# Patient Record
Sex: MALE | Race: WHITE | NOT HISPANIC OR LATINO | Employment: FULL TIME | URBAN - NONMETROPOLITAN AREA
[De-identification: names, ages, dates, MRNs, and addresses within clinical notes are randomized per-mention and may not be internally consistent; named-entity substitution may affect disease eponyms.]

---

## 2021-07-06 ENCOUNTER — HOSPITAL ENCOUNTER (OUTPATIENT)
Facility: HOSPITAL | Age: 26
Discharge: HOME | End: 2021-07-07
Attending: EMERGENCY MEDICINE | Admitting: UROLOGY

## 2021-07-06 ENCOUNTER — ANESTHESIA (OUTPATIENT)
Dept: PERIOP | Facility: HOSPITAL | Age: 26
End: 2021-07-06

## 2021-07-06 ENCOUNTER — APPOINTMENT (OUTPATIENT)
Dept: GENERAL RADIOLOGY | Facility: HOSPITAL | Age: 26
End: 2021-07-06

## 2021-07-06 ENCOUNTER — ANESTHESIA EVENT (OUTPATIENT)
Dept: PERIOP | Facility: HOSPITAL | Age: 26
End: 2021-07-06

## 2021-07-06 ENCOUNTER — APPOINTMENT (OUTPATIENT)
Dept: CT IMAGING | Facility: HOSPITAL | Age: 26
End: 2021-07-06

## 2021-07-06 DIAGNOSIS — R10.9 ACUTE RIGHT FLANK PAIN: ICD-10-CM

## 2021-07-06 DIAGNOSIS — N23 RENAL COLIC ON RIGHT SIDE: Primary | ICD-10-CM

## 2021-07-06 DIAGNOSIS — N20.1 RIGHT URETERAL STONE: ICD-10-CM

## 2021-07-06 LAB
ALBUMIN SERPL-MCNC: 4.7 G/DL (ref 3.5–5.2)
ALBUMIN/GLOB SERPL: 1.7 G/DL
ALP SERPL-CCNC: 66 U/L (ref 39–117)
ALT SERPL W P-5'-P-CCNC: 25 U/L (ref 1–41)
ANION GAP SERPL CALCULATED.3IONS-SCNC: 13 MMOL/L (ref 5–15)
AST SERPL-CCNC: 20 U/L (ref 1–40)
BACTERIA UR QL AUTO: ABNORMAL /HPF
BASOPHILS # BLD AUTO: 0.06 10*3/MM3 (ref 0–0.2)
BASOPHILS NFR BLD AUTO: 0.6 % (ref 0–1.5)
BILIRUB SERPL-MCNC: 0.4 MG/DL (ref 0–1.2)
BILIRUB UR QL STRIP: ABNORMAL
BUN SERPL-MCNC: 16 MG/DL (ref 6–20)
BUN/CREAT SERPL: 14.7 (ref 7–25)
CALCIUM SPEC-SCNC: 10 MG/DL (ref 8.6–10.5)
CHLORIDE SERPL-SCNC: 104 MMOL/L (ref 98–107)
CLARITY UR: ABNORMAL
CO2 SERPL-SCNC: 22 MMOL/L (ref 22–29)
COLOR UR: ABNORMAL
CREAT SERPL-MCNC: 1.09 MG/DL (ref 0.76–1.27)
DEPRECATED RDW RBC AUTO: 44.7 FL (ref 37–54)
EOSINOPHIL # BLD AUTO: 0.21 10*3/MM3 (ref 0–0.4)
EOSINOPHIL NFR BLD AUTO: 2.2 % (ref 0.3–6.2)
ERYTHROCYTE [DISTWIDTH] IN BLOOD BY AUTOMATED COUNT: 14 % (ref 12.3–15.4)
GFR SERPL CREATININE-BSD FRML MDRD: 83 ML/MIN/1.73
GLOBULIN UR ELPH-MCNC: 2.7 GM/DL
GLUCOSE SERPL-MCNC: 133 MG/DL (ref 65–99)
GLUCOSE UR STRIP-MCNC: NEGATIVE MG/DL
HCT VFR BLD AUTO: 44.4 % (ref 37.5–51)
HGB BLD-MCNC: 15.2 G/DL (ref 13–17.7)
HGB UR QL STRIP.AUTO: ABNORMAL
HYALINE CASTS UR QL AUTO: ABNORMAL /LPF
IMM GRANULOCYTES # BLD AUTO: 0.05 10*3/MM3 (ref 0–0.05)
IMM GRANULOCYTES NFR BLD AUTO: 0.5 % (ref 0–0.5)
KETONES UR QL STRIP: NEGATIVE
LEUKOCYTE ESTERASE UR QL STRIP.AUTO: ABNORMAL
LYMPHOCYTES # BLD AUTO: 3.21 10*3/MM3 (ref 0.7–3.1)
LYMPHOCYTES NFR BLD AUTO: 33.8 % (ref 19.6–45.3)
MCH RBC QN AUTO: 30 PG (ref 26.6–33)
MCHC RBC AUTO-ENTMCNC: 34.2 G/DL (ref 31.5–35.7)
MCV RBC AUTO: 87.6 FL (ref 79–97)
MONOCYTES # BLD AUTO: 0.68 10*3/MM3 (ref 0.1–0.9)
MONOCYTES NFR BLD AUTO: 7.2 % (ref 5–12)
NEUTROPHILS NFR BLD AUTO: 5.29 10*3/MM3 (ref 1.7–7)
NEUTROPHILS NFR BLD AUTO: 55.7 % (ref 42.7–76)
NITRITE UR QL STRIP: NEGATIVE
NRBC BLD AUTO-RTO: 0 /100 WBC (ref 0–0.2)
PH UR STRIP.AUTO: <=5 [PH] (ref 5–8)
PLATELET # BLD AUTO: 292 10*3/MM3 (ref 140–450)
PMV BLD AUTO: 9.9 FL (ref 6–12)
POTASSIUM SERPL-SCNC: 3.5 MMOL/L (ref 3.5–5.2)
PROT SERPL-MCNC: 7.4 G/DL (ref 6–8.5)
PROT UR QL STRIP: ABNORMAL
RBC # BLD AUTO: 5.07 10*6/MM3 (ref 4.14–5.8)
RBC # UR: ABNORMAL /HPF
REF LAB TEST METHOD: ABNORMAL
SARS-COV-2 RNA PNL SPEC NAA+PROBE: NOT DETECTED
SODIUM SERPL-SCNC: 139 MMOL/L (ref 136–145)
SP GR UR STRIP: 1.02 (ref 1–1.03)
SQUAMOUS #/AREA URNS HPF: ABNORMAL /HPF
UROBILINOGEN UR QL STRIP: ABNORMAL
WBC # BLD AUTO: 9.5 10*3/MM3 (ref 3.4–10.8)
WBC UR QL AUTO: ABNORMAL /HPF

## 2021-07-06 PROCEDURE — 81001 URINALYSIS AUTO W/SCOPE: CPT | Performed by: EMERGENCY MEDICINE

## 2021-07-06 PROCEDURE — G0378 HOSPITAL OBSERVATION PER HR: HCPCS

## 2021-07-06 PROCEDURE — 25010000002 IOPAMIDOL 61 % SOLUTION: Performed by: UROLOGY

## 2021-07-06 PROCEDURE — C2617 STENT, NON-COR, TEM W/O DEL: HCPCS | Performed by: UROLOGY

## 2021-07-06 PROCEDURE — 96374 THER/PROPH/DIAG INJ IV PUSH: CPT

## 2021-07-06 PROCEDURE — 25010000002 DEXAMETHASONE PER 1 MG: Performed by: NURSE ANESTHETIST, CERTIFIED REGISTERED

## 2021-07-06 PROCEDURE — 80053 COMPREHEN METABOLIC PANEL: CPT | Performed by: EMERGENCY MEDICINE

## 2021-07-06 PROCEDURE — 99220 PR INITIAL OBSERVATION CARE/DAY 70 MINUTES: CPT | Performed by: UROLOGY

## 2021-07-06 PROCEDURE — 25010000002 FUROSEMIDE PER 20 MG: Performed by: NURSE ANESTHETIST, CERTIFIED REGISTERED

## 2021-07-06 PROCEDURE — C1758 CATHETER, URETERAL: HCPCS | Performed by: UROLOGY

## 2021-07-06 PROCEDURE — 25010000002 ONDANSETRON PER 1 MG: Performed by: EMERGENCY MEDICINE

## 2021-07-06 PROCEDURE — 74420 UROGRAPHY RTRGR +-KUB: CPT

## 2021-07-06 PROCEDURE — 74420 UROGRAPHY RTRGR +-KUB: CPT | Performed by: UROLOGY

## 2021-07-06 PROCEDURE — 96375 TX/PRO/DX INJ NEW DRUG ADDON: CPT

## 2021-07-06 PROCEDURE — 25010000002 DEXAMETHASONE PER 1 MG: Performed by: ANESTHESIOLOGY

## 2021-07-06 PROCEDURE — 25010000002 ONDANSETRON PER 1 MG: Performed by: NURSE ANESTHETIST, CERTIFIED REGISTERED

## 2021-07-06 PROCEDURE — 25010000002 KETOROLAC TROMETHAMINE PER 15 MG: Performed by: NURSE ANESTHETIST, CERTIFIED REGISTERED

## 2021-07-06 PROCEDURE — 99284 EMERGENCY DEPT VISIT MOD MDM: CPT

## 2021-07-06 PROCEDURE — 52356 CYSTO/URETERO W/LITHOTRIPSY: CPT | Performed by: UROLOGY

## 2021-07-06 PROCEDURE — C1894 INTRO/SHEATH, NON-LASER: HCPCS | Performed by: UROLOGY

## 2021-07-06 PROCEDURE — 25010000002 MIDAZOLAM PER 1 MG: Performed by: ANESTHESIOLOGY

## 2021-07-06 PROCEDURE — 25010000002 KETOROLAC TROMETHAMINE PER 15 MG: Performed by: EMERGENCY MEDICINE

## 2021-07-06 PROCEDURE — C9803 HOPD COVID-19 SPEC COLLECT: HCPCS

## 2021-07-06 PROCEDURE — 74176 CT ABD & PELVIS W/O CONTRAST: CPT

## 2021-07-06 PROCEDURE — 25010000002 LEVOFLOXACIN PER 250 MG: Performed by: UROLOGY

## 2021-07-06 PROCEDURE — 25010000003 HYDROMORPHONE 1 MG/ML SOLUTION: Performed by: EMERGENCY MEDICINE

## 2021-07-06 PROCEDURE — C1769 GUIDE WIRE: HCPCS | Performed by: UROLOGY

## 2021-07-06 PROCEDURE — 87635 SARS-COV-2 COVID-19 AMP PRB: CPT | Performed by: EMERGENCY MEDICINE

## 2021-07-06 PROCEDURE — 25010000002 PROPOFOL 10 MG/ML EMULSION: Performed by: NURSE ANESTHETIST, CERTIFIED REGISTERED

## 2021-07-06 PROCEDURE — 87086 URINE CULTURE/COLONY COUNT: CPT | Performed by: EMERGENCY MEDICINE

## 2021-07-06 PROCEDURE — 85025 COMPLETE CBC W/AUTO DIFF WBC: CPT | Performed by: EMERGENCY MEDICINE

## 2021-07-06 PROCEDURE — 25010000002 FENTANYL CITRATE (PF) 100 MCG/2ML SOLUTION: Performed by: NURSE ANESTHETIST, CERTIFIED REGISTERED

## 2021-07-06 PROCEDURE — 96376 TX/PRO/DX INJ SAME DRUG ADON: CPT

## 2021-07-06 DEVICE — URETERAL STENT
Type: IMPLANTABLE DEVICE | Site: URETER | Status: FUNCTIONAL
Brand: PERCUFLEX™ PLUS

## 2021-07-06 RX ORDER — ONDANSETRON 2 MG/ML
4 INJECTION INTRAMUSCULAR; INTRAVENOUS AS NEEDED
Status: DISCONTINUED | OUTPATIENT
Start: 2021-07-06 | End: 2021-07-06 | Stop reason: HOSPADM

## 2021-07-06 RX ORDER — FLUMAZENIL 0.1 MG/ML
0.2 INJECTION INTRAVENOUS AS NEEDED
Status: DISCONTINUED | OUTPATIENT
Start: 2021-07-06 | End: 2021-07-06 | Stop reason: HOSPADM

## 2021-07-06 RX ORDER — FENTANYL CITRATE 50 UG/ML
25 INJECTION, SOLUTION INTRAMUSCULAR; INTRAVENOUS
Status: DISCONTINUED | OUTPATIENT
Start: 2021-07-06 | End: 2021-07-06 | Stop reason: HOSPADM

## 2021-07-06 RX ORDER — LEVOFLOXACIN 5 MG/ML
500 INJECTION, SOLUTION INTRAVENOUS
Status: COMPLETED | OUTPATIENT
Start: 2021-07-06 | End: 2021-07-06

## 2021-07-06 RX ORDER — FUROSEMIDE 10 MG/ML
INJECTION INTRAMUSCULAR; INTRAVENOUS AS NEEDED
Status: DISCONTINUED | OUTPATIENT
Start: 2021-07-06 | End: 2021-07-06 | Stop reason: SURG

## 2021-07-06 RX ORDER — FENTANYL CITRATE 50 UG/ML
INJECTION, SOLUTION INTRAMUSCULAR; INTRAVENOUS AS NEEDED
Status: DISCONTINUED | OUTPATIENT
Start: 2021-07-06 | End: 2021-07-06 | Stop reason: SURG

## 2021-07-06 RX ORDER — OXYCODONE AND ACETAMINOPHEN 10; 325 MG/1; MG/1
1 TABLET ORAL ONCE AS NEEDED
Status: DISCONTINUED | OUTPATIENT
Start: 2021-07-06 | End: 2021-07-06 | Stop reason: HOSPADM

## 2021-07-06 RX ORDER — ONDANSETRON 2 MG/ML
INJECTION INTRAMUSCULAR; INTRAVENOUS AS NEEDED
Status: DISCONTINUED | OUTPATIENT
Start: 2021-07-06 | End: 2021-07-06 | Stop reason: SURG

## 2021-07-06 RX ORDER — NALOXONE HCL 0.4 MG/ML
0.04 VIAL (ML) INJECTION AS NEEDED
Status: DISCONTINUED | OUTPATIENT
Start: 2021-07-06 | End: 2021-07-06 | Stop reason: HOSPADM

## 2021-07-06 RX ORDER — SODIUM CHLORIDE 9 MG/ML
75 INJECTION, SOLUTION INTRAVENOUS CONTINUOUS
Status: DISCONTINUED | OUTPATIENT
Start: 2021-07-06 | End: 2021-07-07

## 2021-07-06 RX ORDER — SODIUM CHLORIDE, SODIUM LACTATE, POTASSIUM CHLORIDE, CALCIUM CHLORIDE 600; 310; 30; 20 MG/100ML; MG/100ML; MG/100ML; MG/100ML
9 INJECTION, SOLUTION INTRAVENOUS CONTINUOUS
Status: DISCONTINUED | OUTPATIENT
Start: 2021-07-06 | End: 2021-07-06

## 2021-07-06 RX ORDER — OXYCODONE HYDROCHLORIDE AND ACETAMINOPHEN 5; 325 MG/1; MG/1
1 TABLET ORAL AS NEEDED
COMMUNITY
End: 2021-07-09

## 2021-07-06 RX ORDER — SODIUM CHLORIDE 0.9 % (FLUSH) 0.9 %
10 SYRINGE (ML) INJECTION AS NEEDED
Status: DISCONTINUED | OUTPATIENT
Start: 2021-07-06 | End: 2021-07-07 | Stop reason: HOSPADM

## 2021-07-06 RX ORDER — HYDROMORPHONE HYDROCHLORIDE 1 MG/ML
0.5 INJECTION, SOLUTION INTRAMUSCULAR; INTRAVENOUS; SUBCUTANEOUS
Status: DISCONTINUED | OUTPATIENT
Start: 2021-07-06 | End: 2021-07-07 | Stop reason: HOSPADM

## 2021-07-06 RX ORDER — MAGNESIUM HYDROXIDE 1200 MG/15ML
LIQUID ORAL AS NEEDED
Status: DISCONTINUED | OUTPATIENT
Start: 2021-07-06 | End: 2021-07-06 | Stop reason: HOSPADM

## 2021-07-06 RX ORDER — PROPOFOL 10 MG/ML
VIAL (ML) INTRAVENOUS AS NEEDED
Status: DISCONTINUED | OUTPATIENT
Start: 2021-07-06 | End: 2021-07-06 | Stop reason: SURG

## 2021-07-06 RX ORDER — LIDOCAINE HYDROCHLORIDE 10 MG/ML
0.5 INJECTION, SOLUTION EPIDURAL; INFILTRATION; INTRACAUDAL; PERINEURAL ONCE AS NEEDED
Status: DISCONTINUED | OUTPATIENT
Start: 2021-07-06 | End: 2021-07-06 | Stop reason: HOSPADM

## 2021-07-06 RX ORDER — OXYCODONE AND ACETAMINOPHEN 7.5; 325 MG/1; MG/1
1 TABLET ORAL EVERY 4 HOURS PRN
Status: DISCONTINUED | OUTPATIENT
Start: 2021-07-06 | End: 2021-07-07 | Stop reason: HOSPADM

## 2021-07-06 RX ORDER — SODIUM CHLORIDE 0.9 % (FLUSH) 0.9 %
10 SYRINGE (ML) INJECTION EVERY 12 HOURS SCHEDULED
Status: DISCONTINUED | OUTPATIENT
Start: 2021-07-06 | End: 2021-07-06 | Stop reason: HOSPADM

## 2021-07-06 RX ORDER — IBUPROFEN 600 MG/1
600 TABLET ORAL ONCE AS NEEDED
Status: DISCONTINUED | OUTPATIENT
Start: 2021-07-06 | End: 2021-07-06 | Stop reason: HOSPADM

## 2021-07-06 RX ORDER — DEXAMETHASONE SODIUM PHOSPHATE 4 MG/ML
INJECTION, SOLUTION INTRA-ARTICULAR; INTRALESIONAL; INTRAMUSCULAR; INTRAVENOUS; SOFT TISSUE AS NEEDED
Status: DISCONTINUED | OUTPATIENT
Start: 2021-07-06 | End: 2021-07-06 | Stop reason: SURG

## 2021-07-06 RX ORDER — ONDANSETRON 2 MG/ML
4 INJECTION INTRAMUSCULAR; INTRAVENOUS ONCE
Status: COMPLETED | OUTPATIENT
Start: 2021-07-06 | End: 2021-07-06

## 2021-07-06 RX ORDER — HYDROMORPHONE HYDROCHLORIDE 1 MG/ML
0.5 INJECTION, SOLUTION INTRAMUSCULAR; INTRAVENOUS; SUBCUTANEOUS
Status: DISCONTINUED | OUTPATIENT
Start: 2021-07-06 | End: 2021-07-06 | Stop reason: HOSPADM

## 2021-07-06 RX ORDER — MIDAZOLAM HYDROCHLORIDE 1 MG/ML
1 INJECTION INTRAMUSCULAR; INTRAVENOUS
Status: COMPLETED | OUTPATIENT
Start: 2021-07-06 | End: 2021-07-06

## 2021-07-06 RX ORDER — DEXTROSE MONOHYDRATE 25 G/50ML
12.5 INJECTION, SOLUTION INTRAVENOUS AS NEEDED
Status: DISCONTINUED | OUTPATIENT
Start: 2021-07-06 | End: 2021-07-06 | Stop reason: HOSPADM

## 2021-07-06 RX ORDER — SCOLOPAMINE TRANSDERMAL SYSTEM 1 MG/1
1 PATCH, EXTENDED RELEASE TRANSDERMAL CONTINUOUS
Status: DISCONTINUED | OUTPATIENT
Start: 2021-07-06 | End: 2021-07-07 | Stop reason: HOSPADM

## 2021-07-06 RX ORDER — ONDANSETRON 2 MG/ML
4 INJECTION INTRAMUSCULAR; INTRAVENOUS EVERY 6 HOURS PRN
Status: DISCONTINUED | OUTPATIENT
Start: 2021-07-06 | End: 2021-07-07 | Stop reason: HOSPADM

## 2021-07-06 RX ORDER — LABETALOL HYDROCHLORIDE 5 MG/ML
5 INJECTION, SOLUTION INTRAVENOUS
Status: DISCONTINUED | OUTPATIENT
Start: 2021-07-06 | End: 2021-07-06 | Stop reason: HOSPADM

## 2021-07-06 RX ORDER — KETOROLAC TROMETHAMINE 30 MG/ML
INJECTION, SOLUTION INTRAMUSCULAR; INTRAVENOUS AS NEEDED
Status: DISCONTINUED | OUTPATIENT
Start: 2021-07-06 | End: 2021-07-06 | Stop reason: SURG

## 2021-07-06 RX ORDER — SODIUM CHLORIDE 0.9 % (FLUSH) 0.9 %
10 SYRINGE (ML) INJECTION AS NEEDED
Status: DISCONTINUED | OUTPATIENT
Start: 2021-07-06 | End: 2021-07-06 | Stop reason: HOSPADM

## 2021-07-06 RX ORDER — LIDOCAINE HYDROCHLORIDE 20 MG/ML
INJECTION, SOLUTION EPIDURAL; INFILTRATION; INTRACAUDAL; PERINEURAL AS NEEDED
Status: DISCONTINUED | OUTPATIENT
Start: 2021-07-06 | End: 2021-07-06 | Stop reason: SURG

## 2021-07-06 RX ORDER — KETOROLAC TROMETHAMINE 15 MG/ML
15 INJECTION, SOLUTION INTRAMUSCULAR; INTRAVENOUS ONCE
Status: COMPLETED | OUTPATIENT
Start: 2021-07-06 | End: 2021-07-06

## 2021-07-06 RX ORDER — DEXAMETHASONE SODIUM PHOSPHATE 4 MG/ML
4 INJECTION, SOLUTION INTRA-ARTICULAR; INTRALESIONAL; INTRAMUSCULAR; INTRAVENOUS; SOFT TISSUE ONCE AS NEEDED
Status: COMPLETED | OUTPATIENT
Start: 2021-07-06 | End: 2021-07-06

## 2021-07-06 RX ADMIN — FENTANYL CITRATE 100 MCG: 50 INJECTION, SOLUTION INTRAMUSCULAR; INTRAVENOUS at 11:08

## 2021-07-06 RX ADMIN — SODIUM CHLORIDE, POTASSIUM CHLORIDE, SODIUM LACTATE AND CALCIUM CHLORIDE 1000 ML: 600; 310; 30; 20 INJECTION, SOLUTION INTRAVENOUS at 06:40

## 2021-07-06 RX ADMIN — KETOROLAC TROMETHAMINE 15 MG: 15 INJECTION, SOLUTION INTRAMUSCULAR; INTRAVENOUS at 08:27

## 2021-07-06 RX ADMIN — OXYCODONE HYDROCHLORIDE AND ACETAMINOPHEN 1 TABLET: 7.5; 325 TABLET ORAL at 20:17

## 2021-07-06 RX ADMIN — SCOLOPAMINE TRANSDERMAL SYSTEM 1 PATCH: 1 PATCH, EXTENDED RELEASE TRANSDERMAL at 10:59

## 2021-07-06 RX ADMIN — SODIUM CHLORIDE, POTASSIUM CHLORIDE, SODIUM LACTATE AND CALCIUM CHLORIDE 9 ML/HR: 600; 310; 30; 20 INJECTION, SOLUTION INTRAVENOUS at 10:53

## 2021-07-06 RX ADMIN — DEXMEDETOMIDINE HYDROCHLORIDE 20 MCG: 4 INJECTION, SOLUTION INTRAVENOUS at 11:23

## 2021-07-06 RX ADMIN — PROPOFOL 200 MG: 10 INJECTION, EMULSION INTRAVENOUS at 11:13

## 2021-07-06 RX ADMIN — DEXAMETHASONE SODIUM PHOSPHATE 4 MG: 4 INJECTION, SOLUTION INTRA-ARTICULAR; INTRALESIONAL; INTRAMUSCULAR; INTRAVENOUS; SOFT TISSUE at 10:58

## 2021-07-06 RX ADMIN — ONDANSETRON 4 MG: 2 INJECTION INTRAMUSCULAR; INTRAVENOUS at 06:37

## 2021-07-06 RX ADMIN — MIDAZOLAM 1 MG: 1 INJECTION INTRAMUSCULAR; INTRAVENOUS at 11:09

## 2021-07-06 RX ADMIN — SODIUM CHLORIDE, POTASSIUM CHLORIDE, SODIUM LACTATE AND CALCIUM CHLORIDE: 600; 310; 30; 20 INJECTION, SOLUTION INTRAVENOUS at 11:54

## 2021-07-06 RX ADMIN — HYDROMORPHONE HYDROCHLORIDE 1 MG: 1 INJECTION, SOLUTION INTRAMUSCULAR; INTRAVENOUS; SUBCUTANEOUS at 07:29

## 2021-07-06 RX ADMIN — KETOROLAC TROMETHAMINE 30 MG: 30 INJECTION, SOLUTION INTRAMUSCULAR; INTRAVENOUS at 11:47

## 2021-07-06 RX ADMIN — DEXAMETHASONE SODIUM PHOSPHATE 4 MG: 4 INJECTION, SOLUTION INTRA-ARTICULAR; INTRALESIONAL; INTRAMUSCULAR; INTRAVENOUS; SOFT TISSUE at 11:22

## 2021-07-06 RX ADMIN — SODIUM CHLORIDE 75 ML/HR: 9 INJECTION, SOLUTION INTRAVENOUS at 16:32

## 2021-07-06 RX ADMIN — LIDOCAINE HYDROCHLORIDE 100 MG: 20 INJECTION, SOLUTION EPIDURAL; INFILTRATION; INTRACAUDAL; PERINEURAL at 11:12

## 2021-07-06 RX ADMIN — LEVOFLOXACIN 500 MG: 500 INJECTION, SOLUTION INTRAVENOUS at 10:16

## 2021-07-06 RX ADMIN — ONDANSETRON 4 MG: 2 INJECTION INTRAMUSCULAR; INTRAVENOUS at 11:22

## 2021-07-06 RX ADMIN — DEXMEDETOMIDINE HYDROCHLORIDE 15 MCG: 4 INJECTION, SOLUTION INTRAVENOUS at 11:45

## 2021-07-06 RX ADMIN — HYDROMORPHONE HYDROCHLORIDE 1 MG: 1 INJECTION, SOLUTION INTRAMUSCULAR; INTRAVENOUS; SUBCUTANEOUS at 06:38

## 2021-07-06 RX ADMIN — FUROSEMIDE 10 MG: 10 INJECTION, SOLUTION INTRAMUSCULAR; INTRAVENOUS at 11:45

## 2021-07-06 RX ADMIN — MIDAZOLAM 1 MG: 1 INJECTION INTRAMUSCULAR; INTRAVENOUS at 10:59

## 2021-07-06 NOTE — ED NOTES
Patient presents to the ED with the CC of right lower abd pain with sudden onset about 0530. Patient states he has hx of kidney stone. Patient took a percocet 5mg at 0530. Patient reports blood in urine.        Meg Manrique RN  07/06/21 8443

## 2021-07-06 NOTE — H&P
"Referring Provider: Lio  Reason for Consultation: right proximal ureter stone    Chief complaint: \"Kidney stone\"    Subjective     History of present illness:    Mr. Cordova is a 26-year-old male who presents to emergency department with severe right flank pain and nausea.  He reports that he has been diagnosed with kidney stones on that side in the past.  He denies ever receiving surgery.  He reports that he has had a 9 mm stone on that side.  He last reports pain approximately 3 months ago.  He denies fevers, chills and rigors.  Acute pain started this morning at 5 AM.  He has been n.p.o. for food since 9 PM last night.  He had a couple of sips of water at 5 AM this morning.    Review of Systems  Pertinent items are noted in HPI, all other systems reviewed and negative     History  Past Medical History:   Diagnosis Date   • Renal disorder        Past Surgical History:   Procedure Laterality Date   • HERNIA REPAIR         History reviewed. No pertinent family history.    Social History     Socioeconomic History   • Marital status: Single     Spouse name: Not on file   • Number of children: Not on file   • Years of education: Not on file   • Highest education level: Not on file   Tobacco Use   • Smoking status: Never Smoker   Substance and Sexual Activity   • Alcohol use: Yes   • Drug use: Not Currently   • Sexual activity: Defer       Current Facility-Administered Medications   Medication Dose Route Frequency Provider Last Rate Last Admin   • levoFLOXacin (LEVAQUIN) 500 mg/100 mL D5W (premix) (LEVAQUIN) 500 mg  500 mg Intravenous On Call to OR Jose Au MD       • sodium chloride 0.9 % flush 10 mL  10 mL Intravenous PRN Jamie Vaca MD         Current Outpatient Medications   Medication Sig Dispense Refill   • oxyCODONE-acetaminophen (PERCOCET) 5-325 MG per tablet Take 1 tablet by mouth As Needed. Left over medications from when he was in           No Known Allergies    Objective "     Vital Signs   Temp:  [97.5 °F (36.4 °C)] 97.5 °F (36.4 °C)  Heart Rate:  [49-67] 67  Resp:  [20] 20  BP: (125-177)/(71-86) 141/71  No intake or output data in the 24 hours ending 07/06/21 0919    Physical Exam:    General: Alert, cooperative, nontoxic, uncomfortable  Head:  Normocephalic, without obvious abnormality, atraumatic  Eyes:   Lids and lashes normal, no icterus, no pallor  Ears:  Ears appear intact with no abnormalities noted  Neck:  Supple  Back:  Mild right costovertebral angle tenderness  Lungs: Unlabored respirations  Heart:  Regular rhythm and normal rate  Abdomen: Soft, non-tender, non-distended  :  Deferred  Extremities: Moves all extremities well  Skin:  Warm and dry  Neurologic: Cranial nerves 2 - 12 grossly intact    Data Review:    CT Abdomen Pelvis Without Contrast (07/06/2021 07:25)   Urinalysis With Culture If Indicated - Urine, Clean Catch (07/06/2021 06:35)   Comprehensive Metabolic Panel (07/06/2021 06:34)   CBC & Differential (07/06/2021 06:34)   ED Notes by Meg Manrique RN (07/06/2021 06:22)       Assessment and Plan:    Right ureter stone with right renal colic: We discussed medical expulsive therapy, which the patient has previously failed as he has been on tamsulosin, and surgical intervention with cystoscopy, right retrograde pyelogram, insertion right ureteral stent, possible right ureteroscopy with laser lithotripsy and stone basket extraction, indicated procedures.  The patient would like to proceed with surgical intervention.  Levofloxacin on-call to the OR.  We discussed the risks and benefits including bleeding, infection, stent discomfort, failure of stent placement, need for another surgery for definitive stone treatment, etc.  Questions were answered.  Patient agrees to proceed.    URO DISPO: To OR as above    I discussed the patients findings and my recommendations with patient, family and nursing staff    (Please note that portions of this note were completed  with a voice recognition program.)    Jose Au MD  07/06/21  09:19 CDT    Time: Time spent: 30 minutes spent performing evaluation and management, chart review, and discussion with patient, > 50% of time spent in face-to-face encounter

## 2021-07-06 NOTE — ANESTHESIA PROCEDURE NOTES
Airway  Urgency: elective    Date/Time: 7/6/2021 11:13 AM  Airway not difficult    General Information and Staff    Patient location during procedure: OR  CRNA: VON Shabazz CRNA    Indications and Patient Condition  Indications for airway management: airway protection    Preoxygenated: yes  MILS not maintained throughout  Mask difficulty assessment: 1 - vent by mask    Final Airway Details  Final airway type: supraglottic airway      Successful airway: ProSeal and I-gel  Size 4    Number of attempts at approach: 1  Assessment: lips, teeth, and gum same as pre-op and atraumatic intubation

## 2021-07-06 NOTE — NURSING NOTE
Back from OR via bed with RN. Pain free, attempting to void in urinal. Stent with strings present, frequent void urge.

## 2021-07-06 NOTE — ANESTHESIA POSTPROCEDURE EVALUATION
"Patient: Manuel Cordova    Procedure Summary     Date: 07/06/21 Room / Location:  [facility] OR  / [facility] OR    Anesthesia Start: 1111 Anesthesia Stop: 1200    Procedures:       CYSTOSCOPY RETROGRADE PYELOGRAM AND STENT INSERTION (Right Bladder)      URETEROSCOPY LASER LITHOTRIPSY WITH STENT INSERTION (Right Ureter) Diagnosis:       Renal colic on right side      Right ureteral stone      (Renal colic on right side [N23])      (Right ureteral stone [N20.1])    Surgeons: Jose Au MD Provider: VON Shabazz CRNA    Anesthesia Type: general ASA Status: 1          Anesthesia Type: general    Vitals  Vitals Value Taken Time   /60 07/06/21 1250   Temp 97.8 °F (36.6 °C) 07/06/21 1250   Pulse 65 07/06/21 1251   Resp 16 07/06/21 1240   SpO2 98 % 07/06/21 1250   Vitals shown include unvalidated device data.        Post Anesthesia Care and Evaluation    Patient location during evaluation: PACU  Patient participation: complete - patient participated  Level of consciousness: awake and alert  Pain management: adequate  Airway patency: patent  Anesthetic complications: No anesthetic complications    Cardiovascular status: acceptable  Respiratory status: acceptable  Hydration status: acceptable    Comments: Blood pressure 117/65, pulse 51, temperature 97.6 °F (36.4 °C), resp. rate 16, height 170.2 cm (67\"), weight 77.1 kg (170 lb), SpO2 99 %.    Pt discharged from PACU based on chriss score >8      "

## 2021-07-06 NOTE — ED PROVIDER NOTES
Subjective   This is a very pleasant 26 y.o. male with a past medical history of prior renal colic who presents to the emergency department today with a chief complaint of right flank pain.  Sudden onset 1 hour prior to arrival.  Constant.  Sharp.  Severe.  Starts in his right flank and radiates to his right groin.  There are no exacerbating relieving factors.  It is associated with some urinary hesitancy.  Patient states he has had multiple kidney stones over the past few months while he was in [employer].  States this feels identical to those.  He denies any chest pain, shortness of breath, fevers, chills, nausea, vomiting.  He does endorse hematuria.  He has no numbness, weakness, or paresthesias.            Past medical history: Renal colic  Social history: Denies tobacco or illicit drug use, drinks alcohol occasionally        History provided by:  Patient and parent      Review of Systems   All other systems reviewed and are negative.      Past Medical History:   Diagnosis Date   • Renal disorder        No Known Allergies    Past Surgical History:   Procedure Laterality Date   • CYSTOSCOPY, RETROGRADE PYELOGRAM AND STENT INSERTION Right 7/6/2021    Procedure: CYSTOSCOPY RETROGRADE PYELOGRAM AND STENT INSERTION;  Surgeon: Jose Au MD;  Location: [facility] OR;  Service: Urology;  Laterality: Right;   • HERNIA REPAIR     • URETEROSCOPY LASER LITHOTRIPSY WITH STENT INSERTION Right 7/6/2021    Procedure: URETEROSCOPY LASER LITHOTRIPSY WITH STENT INSERTION;  Surgeon: Jose Au MD;  Location: [facility] OR;  Service: Urology;  Laterality: Right;       History reviewed. No pertinent family history.    Social History     Socioeconomic History   • Marital status: Single     Spouse name: Not on file   • Number of children: Not on file   • Years of education: Not on file   • Highest education level: Not on file   Tobacco Use   • Smoking status: Never Smoker   Substance and Sexual Activity   • Alcohol use: Yes    • Drug use: Not Currently   • Sexual activity: Defer           Objective   Physical Exam  Vitals and nursing note reviewed.   Constitutional:       Appearance: Normal appearance.   HENT:      Head: Normocephalic and atraumatic.      Nose: Nose normal.      Mouth/Throat:      Mouth: Mucous membranes are moist.   Eyes:      Extraocular Movements: Extraocular movements intact.   Cardiovascular:      Rate and Rhythm: Normal rate and regular rhythm.      Pulses: Normal pulses.      Heart sounds: Normal heart sounds. No murmur heard.   No friction rub. No gallop.    Pulmonary:      Effort: Pulmonary effort is normal. No respiratory distress.      Breath sounds: Normal breath sounds. No stridor. No wheezing, rhonchi or rales.   Chest:      Chest wall: No tenderness.   Abdominal:      General: Abdomen is flat. Bowel sounds are normal. There is no distension.      Palpations: Abdomen is soft. There is no mass.      Tenderness: There is no abdominal tenderness. There is no guarding or rebound.   Musculoskeletal:         General: No swelling, tenderness, deformity or signs of injury. Normal range of motion.      Cervical back: Normal range of motion.   Skin:     General: Skin is warm and dry.      Capillary Refill: Capillary refill takes less than 2 seconds.      Coloration: Skin is not jaundiced or pale.      Findings: No bruising, erythema or rash.   Neurological:      General: No focal deficit present.      Mental Status: He is alert and oriented to person, place, and time.   Psychiatric:         Mood and Affect: Mood normal.         Behavior: Behavior normal.         Thought Content: Thought content normal.         Judgment: Judgment normal.         Procedures           ED Course                                           MDM  Number of Diagnoses or Management Options  Acute right flank pain: new and requires workup  Renal colic on right side: new and requires workup  Diagnosis management comments: Patient presents with  right flank pain.  Upon arrival in no acute distress.  Vital signs are reassuring.  IV access is obtained and labs are sent.  He is given Dilaudid, Zofran, fluids.  He is evaluated with a CT scan of the abdomen and pelvis without contrast. His lab work shows no signs of infection or kidney injury. His CT shows a right sided ureteral 7mm stone with mild obstruction. Upon re-evaluation the patient continues to be in pain despite 2mg of dilaudid and 15mg of IV toradol. Due to this as well as the size of the stone, I discuss the patient with on call urology and the patient has been admitted for further evaluation and management.        Amount and/or Complexity of Data Reviewed  Clinical lab tests: ordered and reviewed  Tests in the radiology section of CPT®: ordered and reviewed  Discuss the patient with other providers: yes (Dr. Garcia)    Risk of Complications, Morbidity, and/or Mortality  Presenting problems: high  Diagnostic procedures: high  Management options: high    Patient Progress  Patient progress: improved      Final diagnoses:   Acute right flank pain   Renal colic on right side       ED Disposition  ED Disposition     ED Disposition Condition Comment    Decision to Admit  Level of Care: Telemetry [5]   Diagnosis: Renal colic on right side [[id number]]   Admitting Physician: JOSE GARCIA [[id number]]   Attending Physician: JOSE GARCIA [[id number]]            Jose Garcia MD  [address] [address]  [phone]    Follow up in 1 month(s)  with limited right renal ultrasound and KUB August 4, 2021 at 1:45 pm    Provider, No Known  [facility] [id number]  [phone]               Medication List      No changes were made to your prescriptions during this visit.          Jamie Vaca MD  07/07/21 1000

## 2021-07-06 NOTE — BRIEF OP NOTE
Radiographic performance and interpretation note    Indication: Right proximal ureteral stone    Procedure:  1.  Right retrograde pyelogram    Interpretation:  imaging revealed the stone in the proximal right ureter.  A retrograde pyelogram was performed through a 5 [place] open-ended catheter placed in the distal right ureter.  This confirmed the size and location of the stone.  Mild dilation of the renal pelvis and calyces.

## 2021-07-06 NOTE — ANESTHESIA PREPROCEDURE EVALUATION
Anesthesia Evaluation     Patient summary reviewed   NPO Solid Status: > 8 hours             Airway   Mallampati: I  Dental      Pulmonary    (-) COPD, asthma, sleep apnea, not a smoker  Cardiovascular   Exercise tolerance: excellent (>7 METS)    (-) pacemaker, past MI, angina, cardiac stents      Neuro/Psych  (-) seizures, TIA, CVA  GI/Hepatic/Renal/Endo    (+)   renal disease stones,   (-) GERD, liver disease, diabetes    Musculoskeletal     Abdominal    Substance History      OB/GYN          Other                        Anesthesia Plan    ASA 1     general     intravenous induction     Anesthetic plan, all risks, benefits, and alternatives have been provided, discussed and informed consent has been obtained with: patient.

## 2021-07-06 NOTE — PLAN OF CARE
Goal Outcome Evaluation:           Progress: no change  Outcome Summary: Frequent urination, some small sediment passed in urine, straining urine. No c/o pain, ivfs infusing. Tolerating diet. Up ad dave.

## 2021-07-06 NOTE — OP NOTE
CYSTOSCOPY RETROGRADE PYELOGRAM AND STENT INSERTION, URETEROSCOPY LASER LITHOTRIPSY WITH STENT INSERTION  Procedure Note    Manuel Cordova  7/6/2021    Pre-op Diagnosis:   Right proximal ureteral stone    Post-op Diagnosis:     Same    Procedure(s):  1. Cystoscopy  2. Performance and interpretation of a right retrograde pyelogram  3. Right ureteroscopy with laser lithotripsy and stent placement    Anesthesia: General    Staff:   Circulator: Lizzy Guzman RN; Jeremiah Bunn RN, BSN  Scrub Person: Madeleine Hoffmann  Assistant: Manjinder Rosen  Other: Darlene Perry    Estimated Blood Loss: minimal    Specimens:                None      Drains:   Ureteral Drain/Stent Right ureter 6 Fr. (Active)       Findings: Large dense right proximal ureteral stone which was pushed back into the renal pelvis.  The stone was dusted.    Complications: None    Indications: Intractable right renal colic secondary to a right proximal ureteral stone    Description of Procedure:     The patient was greeted in the holding area.  We discussed the procedure including risks, benefits, alternatives and complications.  All questions were answered.  Informed consent was obtained.    Preoperative antibiotic was administered.  The patient was transported back to the operating suite.  General anesthesia was set followed by airway management.  Patient was prepped and sterilely draped in dorsal lithotomy position.  A formal timeout was performed.    I proceeded with rigid cystourethroscopy.   fluoroscopy was performed. The right ureteral orifice was identified and cannulated with a 5 [place] open-ended catheter. A retrograde pyelogram was performed.  See separate dictated note. The wire was replaced and the open-ended catheter was removed. A 10 [place] dual lumen catheter was placed under fluoroscopic guidance without consequence. A second glide wire was placed. The dual lumen catheter was removed. A ureteral access sheath was placed  under fluoroscopic guidance without consequence. Flexible ureteroscopy was performed.  The stone was visualized in the renal pelvis.  Laser lithotripsy was performed.  Stone was dusted. The ureteral access sheath was removed under direct vision. There were no remaining stone fragments noted under direct vision or upon fluoroscopy.  The ureter was visualized in its entirety under direct visualization with the scope.    The Glidewire was left in place and the scope was removed.  A 6 Yi by 26 cm stent was placed with a dangler string.  The bladder was emptied after the distal curl was noted in position using cystoscopy.  The scope was removed.  The dangler string was taped to the penis.    The  patient was awoken from the procedure and transported in stable condition to the PACU.    Disposition: The patient will be transferred back to the floor when he meets criteria.  We will likely be able to discharge him later today.  He will need to leave his stent in place for a few days.  He can remove this at home or come into my office for a nursing visit.    Jose Au MD     Date: 7/6/2021  Time: 11:56 CDT

## 2021-07-07 VITALS
SYSTOLIC BLOOD PRESSURE: 120 MMHG | HEIGHT: 67 IN | RESPIRATION RATE: 16 BRPM | HEART RATE: 51 BPM | OXYGEN SATURATION: 99 % | BODY MASS INDEX: 26.68 KG/M2 | TEMPERATURE: 98.6 F | WEIGHT: 170 LBS | DIASTOLIC BLOOD PRESSURE: 53 MMHG

## 2021-07-07 LAB — BACTERIA SPEC AEROBE CULT: NO GROWTH

## 2021-07-07 PROCEDURE — G0378 HOSPITAL OBSERVATION PER HR: HCPCS

## 2021-07-07 PROCEDURE — 99225 PR SBSQ OBSERVATION CARE/DAY 25 MINUTES: CPT | Performed by: UROLOGY

## 2021-07-07 RX ADMIN — SODIUM CHLORIDE 75 ML/HR: 9 INJECTION, SOLUTION INTRAVENOUS at 02:06

## 2021-07-07 NOTE — PLAN OF CARE
Goal Outcome Evaluation:           Progress: no change  Outcome Summary: Patient c/o of pain x1. See Mar. Voiding per urinal. Straining urine. IVF infusing. Up with minimal assist. Safety maintained.

## 2021-07-07 NOTE — DISCHARGE SUMMARY
Date of Discharge:  7/7/2021    Discharge Diagnosis: right ureter stone    Presenting Problem/History of Present Illness  Renal colic on right side [N23]     Right ureter stone    Hospital Course  See notes    Procedures Performed  Procedure(s):  CYSTOSCOPY RETROGRADE PYELOGRAM AND STENT INSERTION  URETEROSCOPY LASER LITHOTRIPSY WITH STENT INSERTION       Consults:   Consults     No orders found for last 30 day(s).          Condition on Discharge:  Stable    Vital Signs  Temp:  [97.6 °F (36.4 °C)-99.2 °F (37.3 °C)] 98.6 °F (37 °C)  Heart Rate:  [51-98] 51  Resp:  [16-18] 16  BP: ()/(45-88) 120/53    Discharge Disposition  Home or Self Care    Discharge Medications     Discharge Medications      Continue These Medications      Instructions Start Date   NON FORMULARY   1 dose, Oral, Daily PRN      oxyCODONE-acetaminophen 5-325 MG per tablet  Commonly known as: PERCOCET   1 tablet, Oral, As Needed, Left over medications from when he was in               Discharge Diet: Resume home diet. Increase water.    Activity at Discharge: Light duty until stent removed    Follow-up Appointments  No future appointments.  Additional Instructions for the Follow-ups that You Need to Schedule     US Renal Limited    Aug 07, 2021 (Approximate)      Right kidney only    Right kidney only    Order Comments: Right kidney only     Exam reason: right ureter stone s/p laser         XR Abdomen KUB    Aug 07, 2021 (Approximate)      Exam reason: right ureter stone s/p laser               Test Results Pending at Discharge  Pending Labs     Order Current Status    Urine Culture - Urine, Urine, Clean Catch In process           Jose Au MD  07/07/21  07:48 CDT    Time: Discharge 25 min

## 2021-07-08 ENCOUNTER — TELEPHONE (OUTPATIENT)
Dept: UROLOGY | Facility: CLINIC | Age: 26
End: 2021-07-08

## 2021-07-08 NOTE — TELEPHONE ENCOUNTER
Pt mom called and stated the pt is trying to pull the stent and they think its now stuck.  I let pt mom know that it needed to be one starr pull and not to stop. Pt prob thinking its stuck cause he is stopping in the middle. Needs to just pull the stent til out.

## 2021-07-09 ENCOUNTER — HOSPITAL ENCOUNTER (EMERGENCY)
Facility: HOSPITAL | Age: 26
Discharge: HOME | End: 2021-07-09
Admitting: EMERGENCY MEDICINE

## 2021-07-09 ENCOUNTER — TELEPHONE (OUTPATIENT)
Dept: UROLOGY | Facility: CLINIC | Age: 26
End: 2021-07-09

## 2021-07-09 ENCOUNTER — APPOINTMENT (OUTPATIENT)
Dept: CT IMAGING | Facility: HOSPITAL | Age: 26
End: 2021-07-09

## 2021-07-09 VITALS
BODY MASS INDEX: 26.68 KG/M2 | HEART RATE: 52 BPM | TEMPERATURE: 98.1 F | RESPIRATION RATE: 16 BRPM | SYSTOLIC BLOOD PRESSURE: 132 MMHG | DIASTOLIC BLOOD PRESSURE: 78 MMHG | OXYGEN SATURATION: 100 % | HEIGHT: 67 IN | WEIGHT: 170 LBS

## 2021-07-09 DIAGNOSIS — N20.0 KIDNEY STONE ON RIGHT SIDE: ICD-10-CM

## 2021-07-09 DIAGNOSIS — N20.1 RIGHT URETERAL STONE: Primary | ICD-10-CM

## 2021-07-09 DIAGNOSIS — N13.30 HYDRONEPHROSIS, RIGHT: ICD-10-CM

## 2021-07-09 LAB
ALBUMIN SERPL-MCNC: 4.7 G/DL (ref 3.5–5.2)
ALBUMIN/GLOB SERPL: 2 G/DL
ALP SERPL-CCNC: 66 U/L (ref 39–117)
ALT SERPL W P-5'-P-CCNC: 18 U/L (ref 1–41)
ANION GAP SERPL CALCULATED.3IONS-SCNC: 10 MMOL/L (ref 5–15)
AST SERPL-CCNC: 16 U/L (ref 1–40)
BACTERIA UR QL AUTO: ABNORMAL /HPF
BASOPHILS # BLD AUTO: 0.08 10*3/MM3 (ref 0–0.2)
BASOPHILS NFR BLD AUTO: 0.7 % (ref 0–1.5)
BILIRUB SERPL-MCNC: 0.5 MG/DL (ref 0–1.2)
BILIRUB UR QL STRIP: NEGATIVE
BUN SERPL-MCNC: 13 MG/DL (ref 6–20)
BUN/CREAT SERPL: 11.3 (ref 7–25)
CALCIUM SPEC-SCNC: 9.8 MG/DL (ref 8.6–10.5)
CHLORIDE SERPL-SCNC: 104 MMOL/L (ref 98–107)
CLARITY UR: CLEAR
CO2 SERPL-SCNC: 27 MMOL/L (ref 22–29)
COLOR UR: YELLOW
CREAT SERPL-MCNC: 1.15 MG/DL (ref 0.76–1.27)
DEPRECATED RDW RBC AUTO: 43.3 FL (ref 37–54)
EOSINOPHIL # BLD AUTO: 0.1 10*3/MM3 (ref 0–0.4)
EOSINOPHIL NFR BLD AUTO: 0.9 % (ref 0.3–6.2)
ERYTHROCYTE [DISTWIDTH] IN BLOOD BY AUTOMATED COUNT: 13.4 % (ref 12.3–15.4)
GFR SERPL CREATININE-BSD FRML MDRD: 78 ML/MIN/1.73
GLOBULIN UR ELPH-MCNC: 2.4 GM/DL
GLUCOSE SERPL-MCNC: 106 MG/DL (ref 65–99)
GLUCOSE UR STRIP-MCNC: NEGATIVE MG/DL
HCT VFR BLD AUTO: 43.1 % (ref 37.5–51)
HGB BLD-MCNC: 14.7 G/DL (ref 13–17.7)
HGB UR QL STRIP.AUTO: ABNORMAL
HOLD SPECIMEN: NORMAL
HOLD SPECIMEN: NORMAL
HYALINE CASTS UR QL AUTO: ABNORMAL /LPF
IMM GRANULOCYTES # BLD AUTO: 0.04 10*3/MM3 (ref 0–0.05)
IMM GRANULOCYTES NFR BLD AUTO: 0.4 % (ref 0–0.5)
KETONES UR QL STRIP: NEGATIVE
LEUKOCYTE ESTERASE UR QL STRIP.AUTO: ABNORMAL
LIPASE SERPL-CCNC: 42 U/L (ref 13–60)
LYMPHOCYTES # BLD AUTO: 2.16 10*3/MM3 (ref 0.7–3.1)
LYMPHOCYTES NFR BLD AUTO: 19.6 % (ref 19.6–45.3)
MCH RBC QN AUTO: 30 PG (ref 26.6–33)
MCHC RBC AUTO-ENTMCNC: 34.1 G/DL (ref 31.5–35.7)
MCV RBC AUTO: 88 FL (ref 79–97)
MONOCYTES # BLD AUTO: 0.93 10*3/MM3 (ref 0.1–0.9)
MONOCYTES NFR BLD AUTO: 8.4 % (ref 5–12)
NEUTROPHILS NFR BLD AUTO: 7.73 10*3/MM3 (ref 1.7–7)
NEUTROPHILS NFR BLD AUTO: 70 % (ref 42.7–76)
NITRITE UR QL STRIP: NEGATIVE
NRBC BLD AUTO-RTO: 0 /100 WBC (ref 0–0.2)
PH UR STRIP.AUTO: 6 [PH] (ref 5–8)
PLATELET # BLD AUTO: 270 10*3/MM3 (ref 140–450)
PMV BLD AUTO: 10 FL (ref 6–12)
POTASSIUM SERPL-SCNC: 3.6 MMOL/L (ref 3.5–5.2)
PROT SERPL-MCNC: 7.1 G/DL (ref 6–8.5)
PROT UR QL STRIP: ABNORMAL
RBC # BLD AUTO: 4.9 10*6/MM3 (ref 4.14–5.8)
RBC # UR: ABNORMAL /HPF
REF LAB TEST METHOD: ABNORMAL
SODIUM SERPL-SCNC: 141 MMOL/L (ref 136–145)
SP GR UR STRIP: 1.01 (ref 1–1.03)
SQUAMOUS #/AREA URNS HPF: ABNORMAL /HPF
UROBILINOGEN UR QL STRIP: ABNORMAL
WBC # BLD AUTO: 11.04 10*3/MM3 (ref 3.4–10.8)
WBC UR QL AUTO: ABNORMAL /HPF
WHOLE BLOOD HOLD SPECIMEN: NORMAL

## 2021-07-09 PROCEDURE — 83690 ASSAY OF LIPASE: CPT

## 2021-07-09 PROCEDURE — 96374 THER/PROPH/DIAG INJ IV PUSH: CPT

## 2021-07-09 PROCEDURE — 81001 URINALYSIS AUTO W/SCOPE: CPT

## 2021-07-09 PROCEDURE — 80053 COMPREHEN METABOLIC PANEL: CPT

## 2021-07-09 PROCEDURE — 25010000002 KETOROLAC TROMETHAMINE PER 15 MG: Performed by: NURSE PRACTITIONER

## 2021-07-09 PROCEDURE — 99283 EMERGENCY DEPT VISIT LOW MDM: CPT

## 2021-07-09 PROCEDURE — 74176 CT ABD & PELVIS W/O CONTRAST: CPT

## 2021-07-09 PROCEDURE — 85025 COMPLETE CBC W/AUTO DIFF WBC: CPT

## 2021-07-09 RX ORDER — OXYCODONE AND ACETAMINOPHEN 7.5; 325 MG/1; MG/1
1 TABLET ORAL EVERY 4 HOURS PRN
Qty: 18 TABLET | Refills: 0 | Status: SHIPPED | OUTPATIENT
Start: 2021-07-09 | End: 2021-08-04

## 2021-07-09 RX ORDER — KETOROLAC TROMETHAMINE 10 MG/1
10 TABLET, FILM COATED ORAL EVERY 6 HOURS PRN
Qty: 20 TABLET | Refills: 0 | Status: SHIPPED | OUTPATIENT
Start: 2021-07-09 | End: 2021-08-04

## 2021-07-09 RX ORDER — KETOROLAC TROMETHAMINE 15 MG/ML
15 INJECTION, SOLUTION INTRAMUSCULAR; INTRAVENOUS ONCE
Status: COMPLETED | OUTPATIENT
Start: 2021-07-09 | End: 2021-07-09

## 2021-07-09 RX ORDER — SODIUM CHLORIDE 0.9 % (FLUSH) 0.9 %
10 SYRINGE (ML) INJECTION AS NEEDED
Status: DISCONTINUED | OUTPATIENT
Start: 2021-07-09 | End: 2021-07-09 | Stop reason: HOSPADM

## 2021-07-09 RX ORDER — TAMSULOSIN HYDROCHLORIDE 0.4 MG/1
1 CAPSULE ORAL DAILY
Qty: 30 CAPSULE | Refills: 0 | Status: SHIPPED | OUTPATIENT
Start: 2021-07-09 | End: 2021-08-04

## 2021-07-09 RX ORDER — ONDANSETRON 8 MG/1
8 TABLET, ORALLY DISINTEGRATING ORAL EVERY 8 HOURS PRN
Qty: 10 TABLET | Refills: 1 | Status: SHIPPED | OUTPATIENT
Start: 2021-07-09 | End: 2021-08-04

## 2021-07-09 RX ADMIN — KETOROLAC TROMETHAMINE 15 MG: 15 INJECTION, SOLUTION INTRAMUSCULAR; INTRAVENOUS at 16:49

## 2021-07-09 NOTE — ED PROVIDER NOTES
Subjective   PT is a 26 year old  male who presents with complaints of right sided flank pain that radiates into his right groin area.  Pt states he presented to [facility] ER Tuesday and was diagnosed with kidney stone, Dr. Au performed a cystoscopy with lithotripsy and stent placement.  The stent was removed yesterday per Dr. Au, pt states after removal of the stent he started having increasing right flank pain, denies any trouble urinating, no fever, patient is diaphoretic at this time. Pt has had intermittent nausea, states he was nauseous prior to arrival to ER but denies currently.  Pt called the urology office with complaints of pain and was instructed to come the ER for further evaluation.  Pt reports that he was taking over the counter anti-inflammatory medications without relief of his pain.        History provided by:  Patient   used: No        Review of Systems   Constitutional: Positive for diaphoresis. Negative for activity change, appetite change, chills, fatigue and fever.   HENT: Negative.  Negative for congestion, ear pain, facial swelling, sinus pain, sore throat and trouble swallowing.    Eyes: Negative.  Negative for pain, discharge, redness and itching.   Respiratory: Negative.  Negative for cough, chest tightness, shortness of breath and wheezing.    Cardiovascular: Negative.  Negative for chest pain and palpitations.   Gastrointestinal: Negative.  Negative for abdominal distention, abdominal pain, constipation, diarrhea, nausea and vomiting.   Endocrine: Negative.  Negative for cold intolerance and heat intolerance.   Genitourinary: Positive for flank pain. Negative for decreased urine volume, difficulty urinating, dysuria, frequency, hematuria, penile pain, scrotal swelling, testicular pain and urgency.        Radiates around to right groin area.    Musculoskeletal: Negative for arthralgias, back pain and joint swelling.   Skin: Negative.  Negative for  color change, pallor and rash.   Allergic/Immunologic: Negative.  Negative for environmental allergies.   Neurological: Negative.  Negative for dizziness, weakness, light-headedness and headaches.   Hematological: Negative.  Negative for adenopathy. Does not bruise/bleed easily.   Psychiatric/Behavioral: Negative.  Negative for agitation, behavioral problems and confusion. The patient is not nervous/anxious.    All other systems reviewed and are negative.      Past Medical History:   Diagnosis Date   • Renal disorder        No Known Allergies    Past Surgical History:   Procedure Laterality Date   • CYSTOSCOPY, RETROGRADE PYELOGRAM AND STENT INSERTION Right 7/6/2021    Procedure: CYSTOSCOPY RETROGRADE PYELOGRAM AND STENT INSERTION;  Surgeon: Jose Au MD;  Location: [facility] OR;  Service: Urology;  Laterality: Right;   • HERNIA REPAIR     • URETEROSCOPY LASER LITHOTRIPSY WITH STENT INSERTION Right 7/6/2021    Procedure: URETEROSCOPY LASER LITHOTRIPSY WITH STENT INSERTION;  Surgeon: Jose Au MD;  Location: [facility] OR;  Service: Urology;  Laterality: Right;       History reviewed. No pertinent family history.    Social History     Socioeconomic History   • Marital status: Single     Spouse name: Not on file   • Number of children: Not on file   • Years of education: Not on file   • Highest education level: Not on file   Tobacco Use   • Smoking status: Never Smoker   Substance and Sexual Activity   • Alcohol use: Yes     Comment: occasionally   • Drug use: Not Currently   • Sexual activity: Defer           Objective   Physical Exam  Vitals and nursing note reviewed.   Constitutional:       General: He is not in acute distress.     Appearance: Normal appearance. He is normal weight. He is not ill-appearing.   HENT:      Head: Normocephalic and atraumatic.      Right Ear: Tympanic membrane, ear canal and external ear normal.      Left Ear: Tympanic membrane, ear canal and external ear normal.      Nose:  Nose normal.      Mouth/Throat:      Mouth: Mucous membranes are moist.      Pharynx: Oropharynx is clear. No oropharyngeal exudate or posterior oropharyngeal erythema.   Eyes:      General:         Right eye: No discharge.         Left eye: No discharge.      Extraocular Movements: Extraocular movements intact.      Conjunctiva/sclera: Conjunctivae normal.      Pupils: Pupils are equal, round, and reactive to light.   Cardiovascular:      Rate and Rhythm: Normal rate and regular rhythm.      Pulses: Normal pulses.      Heart sounds: Normal heart sounds. No murmur heard.   No friction rub. No gallop.    Pulmonary:      Effort: Pulmonary effort is normal. No respiratory distress.      Breath sounds: Normal breath sounds. No wheezing, rhonchi or rales.   Abdominal:      General: Abdomen is flat. Bowel sounds are normal. There is no distension.      Palpations: Abdomen is soft. There is no mass.      Tenderness: There is abdominal tenderness. There is right CVA tenderness. There is no left CVA tenderness, guarding or rebound.      Comments: Right lower quadrant tenderness noted, no rebound tenderness only with palpation   Musculoskeletal:         General: No swelling or tenderness. Normal range of motion.      Cervical back: Normal range of motion and neck supple. No rigidity or tenderness.   Skin:     General: Skin is warm and dry.      Capillary Refill: Capillary refill takes less than 2 seconds.      Coloration: Skin is not pale.      Findings: No bruising or erythema.   Neurological:      General: No focal deficit present.      Mental Status: He is alert and oriented to person, place, and time. Mental status is at baseline.      Motor: No weakness.   Psychiatric:         Mood and Affect: Mood normal.         Behavior: Behavior normal.         Thought Content: Thought content normal.         Judgment: Judgment normal.         Procedures           ED Course  ED Course as of Jul 11 1422 Fri Jul 09, 2021 1928  Called and spoke with Dr. Au after paging for the 4th time.  After reviewing lab and CT results he states he will coming in to the ER to see the patient     [WS]   1940 Dr. Au here to see patient     [WS]      ED Course User Index  [WS] Magy Umaña APRN                                           [facility]    Final diagnoses:   Kidney stone on right side   Hydronephrosis, right       ED Disposition  ED Disposition     ED Disposition Condition Comment    Discharge Stable           Jose Au MD  [address]  [place] [id number] [phone]      Keep scheduled follow up         Medication List      New Prescriptions    ketorolac 10 MG tablet  Commonly known as: TORADOL  Take 1 tablet by mouth Every 6 (Six) Hours As Needed for Moderate Pain  for up to 20 doses.     ondansetron ODT 8 MG disintegrating tablet  Commonly known as: ZOFRAN-ODT  Place 1 tablet on the tongue Every 8 (Eight) Hours As Needed for Nausea or Vomiting.     oxyCODONE-acetaminophen 7.5-325 MG per tablet  Commonly known as: Percocet  Take 1 tablet by mouth Every 4 (Four) Hours As Needed for Moderate Pain .  Replaces: oxyCODONE-acetaminophen 5-325 MG per tablet     tamsulosin 0.4 MG capsule 24 hr capsule  Commonly known as: FLOMAX  Take 1 capsule by mouth Daily.        Stop    oxyCODONE-acetaminophen 5-325 MG per tablet  Commonly known as: PERCOCET  Replaced by: oxyCODONE-acetaminophen 7.5-325 MG per tablet           Where to Get Your Medications      These medications were sent to [facility]/pharmacy #[id number] - [place], [place] - [address] AT [address]OSS [address] - [phone]  - [phone]   [address] [place] [address]    Hours: 24-hours Phone: [phone]   · ketorolac 10 MG tablet  · ondansetron ODT 8 MG disintegrating tablet  · oxyCODONE-acetaminophen 7.5-325 MG per tablet  · tamsulosin 0.4 MG capsule 24 hr capsule          Magy Umaña APRN  07/11/21 [id number]

## 2021-07-09 NOTE — TELEPHONE ENCOUNTER
"Patient mother called in stating that her son's pain has gotten worse yesterday. She said he was currently \"laying in the floor rolling around in pain\". I asked her if he was able to successfully remove the string stent and she verbalized he was. They had the stent in a bag. I asked her if he had nausea, vomiting, or fever and she stated no. She said he had been alternating tylenol and ibuprofen.   I advised her that he if was in that much pain, then he needed to go to the E.R for further evaluation. Patient mother verbalized understanding and all questions were answered.   "

## 2021-07-10 NOTE — CONSULTS
"Referring Provider: Leeroy  Reason for Consultation: Right ureteral stone fragment    Chief complaint: \"right flank pain\"    Subjective     History of present illness:    26-year-old male POD#3 s/p right URS with laser and stent. He removed the stent yesterday. No FCR. Some nausea. No emesis.    Review of Systems  Pertinent items are noted in HPI, all other systems reviewed and negative     History  Past Medical History:   Diagnosis Date   • Renal disorder        Past Surgical History:   Procedure Laterality Date   • CYSTOSCOPY, RETROGRADE PYELOGRAM AND STENT INSERTION Right 7/6/2021    Procedure: CYSTOSCOPY RETROGRADE PYELOGRAM AND STENT INSERTION;  Surgeon: Jose Au MD;  Location: [facility];  Service: Urology;  Laterality: Right;   • HERNIA REPAIR     • URETEROSCOPY LASER LITHOTRIPSY WITH STENT INSERTION Right 7/6/2021    Procedure: URETEROSCOPY LASER LITHOTRIPSY WITH STENT INSERTION;  Surgeon: Jose Au MD;  Location: [facility];  Service: Urology;  Laterality: Right;       History reviewed. No pertinent family history.    Social History     Socioeconomic History   • Marital status: Single     Spouse name: Not on file   • Number of children: Not on file   • Years of education: Not on file   • Highest education level: Not on file   Tobacco Use   • Smoking status: Never Smoker   Substance and Sexual Activity   • Alcohol use: Yes     Comment: occasionally   • Drug use: Not Currently   • Sexual activity: Defer       Current Facility-Administered Medications   Medication Dose Route Frequency Provider Last Rate Last Admin   • sodium chloride 0.9 % flush 10 mL  10 mL Intravenous PRN Emergency, Triage Protocol, MD       • sodium chloride 0.9 % flush 10 mL  10 mL Intravenous PRN Magy Umaña APRN         Current Outpatient Medications   Medication Sig Dispense Refill   • ketorolac (TORADOL) 10 MG tablet Take 1 tablet by mouth Every 6 (Six) Hours As Needed for Moderate Pain  for up to 20 doses. 20 " tablet 0   • NON FORMULARY Take 1 dose by mouth Daily As Needed (for seasonal allergies, unknown med).     • ondansetron ODT (ZOFRAN-ODT) 8 MG disintegrating tablet Place 1 tablet on the tongue Every 8 (Eight) Hours As Needed for Nausea or Vomiting. 10 tablet 1   • oxyCODONE-acetaminophen (Percocet) 7.5-325 MG per tablet Take 1 tablet by mouth Every 4 (Four) Hours As Needed for Moderate Pain . 18 tablet 0   • tamsulosin (FLOMAX) 0.4 MG capsule 24 hr capsule Take 1 capsule by mouth Daily. 30 capsule 0        No Known Allergies    Objective     Vital Signs   Temp:  [98.1 °F (36.7 °C)] 98.1 °F (36.7 °C)  Heart Rate:  [49-88] 52  Resp:  [18] 18  BP: (122-132)/(65-78) 132/78  No intake or output data in the 24 hours ending 07/09/21 2008    Physical Exam:    General: Alert, cooperative, nontoxic, mildly uncomfortable  Head:  Normocephalic, without obvious abnormality, atraumatic  Eyes:   Lids and lashes normal, no icterus, no pallor  Ears:  Ears appear intact with no abnormalities noted  Neck:  Supple  Back:  No costovertebral angle tenderness  Lungs: Unlabored respirations  Heart:  Regular rhythm and normal rate  Abdomen: Soft, non-tender, non-distended  :  Deferred  Extremities: Moves all extremities well  Skin:  Warm and dry  Neurologic: Cranial nerves 2 - 12 grossly intact    Data Review:    CT Abdomen Pelvis Without Contrast (07/09/2021 17:28)   Urinalysis, Microscopic Only - Urine, Clean Catch (07/09/2021 16:53)   Urinalysis With Microscopic If Indicated (No Culture) - Urine, Clean Catch (07/09/2021 16:53)   Comprehensive Metabolic Panel (07/09/2021 16:47)   CBC & Differential (07/09/2021 16:47)       Assessment and Plan:    Right ureteral stone fragments: Discussed medical expulsive therapy for admit to observation with possible return to OR. The fragments have a high percentage change of passing. The patient doesn't want another stent and would prefer to try to pass them. Will Rx: ketorolac, percocet,  tamsulosin, ondansetron. FU in 10 days if no resolution of pain, otherwise keep 4 week appointment.    URO DISPO: May DC from ER    I discussed the patients findings and my recommendations with patient and family    (Please note that portions of this note were completed with a voice recognition program.)    Jose Au MD  07/09/21  20:08 CDT    Time: Time spent: 40 minutes spent performing evaluation and management, chart review, and discussion with patient, > 50% of time spent in face-to-face encounter

## 2021-07-12 ENCOUNTER — TELEPHONE (OUTPATIENT)
Dept: UROLOGY | Facility: CLINIC | Age: 26
End: 2021-07-12

## 2021-07-12 NOTE — TELEPHONE ENCOUNTER
Called pt and left message on his mothers phone to check and see how the pt was doing. Told her to call back and if he was still having troubles to let me know and we would move his apt up to the next week dr pratt is in the office.

## 2021-08-04 ENCOUNTER — HOSPITAL ENCOUNTER (OUTPATIENT)
Dept: GENERAL RADIOLOGY | Facility: HOSPITAL | Age: 26
Discharge: HOME | End: 2021-08-04

## 2021-08-04 ENCOUNTER — HOSPITAL ENCOUNTER (OUTPATIENT)
Dept: ULTRASOUND IMAGING | Facility: HOSPITAL | Age: 26
Discharge: HOME | End: 2021-08-04

## 2021-08-04 ENCOUNTER — OFFICE VISIT (OUTPATIENT)
Dept: UROLOGY | Facility: CLINIC | Age: 26
End: 2021-08-04

## 2021-08-04 VITALS — TEMPERATURE: 97.7 F | HEIGHT: 67 IN | WEIGHT: 170 LBS | BODY MASS INDEX: 26.68 KG/M2

## 2021-08-04 DIAGNOSIS — N20.1 RIGHT URETERAL STONE: ICD-10-CM

## 2021-08-04 DIAGNOSIS — R10.9 ACUTE RIGHT FLANK PAIN: Primary | ICD-10-CM

## 2021-08-04 LAB
BILIRUB BLD-MCNC: NEGATIVE MG/DL
CLARITY, POC: CLEAR
COLOR UR: YELLOW
GLUCOSE UR STRIP-MCNC: NEGATIVE MG/DL
KETONES UR QL: NEGATIVE
LEUKOCYTE EST, POC: NEGATIVE
NITRITE UR-MCNC: NEGATIVE MG/ML
PH UR: 6.5 [PH] (ref 5–8)
PROT UR STRIP-MCNC: NEGATIVE MG/DL
RBC # UR STRIP: ABNORMAL /UL
SP GR UR: 1.02 (ref 1–1.03)
UROBILINOGEN UR QL: NORMAL

## 2021-08-04 PROCEDURE — 81003 URINALYSIS AUTO W/O SCOPE: CPT | Performed by: UROLOGY

## 2021-08-04 PROCEDURE — 99213 OFFICE O/P EST LOW 20 MIN: CPT | Performed by: UROLOGY

## 2021-08-04 PROCEDURE — 76775 US EXAM ABDO BACK WALL LIM: CPT

## 2021-08-04 PROCEDURE — 74018 RADEX ABDOMEN 1 VIEW: CPT

## 2023-04-19 ENCOUNTER — OFFICE VISIT (OUTPATIENT)
Dept: FAMILY MEDICINE CLINIC | Facility: CLINIC | Age: 28
End: 2023-04-19
Payer: COMMERCIAL

## 2023-04-19 ENCOUNTER — TELEPHONE (OUTPATIENT)
Dept: FAMILY MEDICINE CLINIC | Facility: CLINIC | Age: 28
End: 2023-04-19

## 2023-04-19 VITALS
HEIGHT: 67 IN | DIASTOLIC BLOOD PRESSURE: 82 MMHG | SYSTOLIC BLOOD PRESSURE: 124 MMHG | OXYGEN SATURATION: 99 % | HEART RATE: 84 BPM | BODY MASS INDEX: 28.88 KG/M2 | RESPIRATION RATE: 20 BRPM | WEIGHT: 184 LBS

## 2023-04-19 DIAGNOSIS — D36.9 DERMOID CYST: ICD-10-CM

## 2023-04-19 DIAGNOSIS — G44.89 OTHER HEADACHE SYNDROME: Primary | ICD-10-CM

## 2023-04-19 PROCEDURE — 99204 OFFICE O/P NEW MOD 45 MIN: CPT | Performed by: PEDIATRICS

## 2023-04-19 RX ORDER — OMEPRAZOLE 20 MG/1
20 CAPSULE, DELAYED RELEASE ORAL DAILY
COMMUNITY

## 2023-04-19 NOTE — TELEPHONE ENCOUNTER
Please call patient to schedule a 2 week appt with Dr. Day for a procedure.  It needs to be a 30 minute slot.

## 2023-04-19 NOTE — ASSESSMENT & PLAN NOTE
Headaches are worsening.  Further diagnostic evaluation per orders.     In January had episode of severe head pain right fronto-parietal area assoc with a straining cough.   It has persisted   Has peroids of increased pressure pain  Hurts to lay on that side at times  Wasn't associated with nausea, visual changes.   Seems to may be increasing in frequency   Will order mri.

## 2023-04-19 NOTE — PROGRESS NOTES
"Chief Complaint  Annual Exam, Headache, and Back Pain    Subjective    History of Present Illness      Patient presents to [facility] for   History of Present Illness  Pt is being seen for annual exam wishing to establish care today. Pt c/o headaches. States have been present intermittently since late January. States on average has headache every other day. Pt c/o lower back pain. States had previous fracture to L5 several years ago when in . Pt states pain is constant. States pain radiates down both legs at times. Rates current pain level at a 5 on a scale of 1-10.        Review of Systems    I have reviewed and agree with the HPI information as above.  Shaka Day MD     Objective   Vital Signs:   /82   Pulse 84   Resp 20   Ht 170.2 cm (67\")   Wt 83.5 kg (184 lb)   SpO2 99%   BMI 28.82 kg/m²     BMI is >= 25 and <30. (Overweight) The following options were offered after discussion;: nutrition counseling/recommendations      Physical Exam  Constitutional:       Appearance: Normal appearance. He is normal weight.   Cardiovascular:      Rate and Rhythm: Normal rate and regular rhythm.      Heart sounds: Normal heart sounds.   Pulmonary:      Effort: Pulmonary effort is normal.      Breath sounds: Normal breath sounds.   Skin:     Findings: Lesion present.             Comments: Dermoid cyst 1 cm   Neurological:      Mental Status: He is alert.   Psychiatric:         Mood and Affect: Mood normal.         Behavior: Behavior normal.          LILA-7: Over the last two weeks, how often have you been bothered by the following problems?  Feeling nervous, anxious or on edge: More than half the days  Not being able to stop or control worrying: More than half the days  Worrying too much about different things: More than half the days  Trouble Relaxing: Several days  Being so restless that it is hard to sit still: Several days  Becoming easily annoyed or irritable: Several " days  Feeling afraid as if something awful might happen: Not at all  LILA 7 Total Score: 9  If you checked any problems, how difficult have these problems made it for you to do your work, take care of things at home, or get along with other people: Somewhat difficult    PHQ-2 Depression Screening  Little interest or pleasure in doing things? 0-->not at all   Feeling down, depressed, or hopeless? 0-->not at all   PHQ-2 Total Score 0     PHQ-9 Depression Screening  Little interest or pleasure in doing things? 0-->not at all   Feeling down, depressed, or hopeless? 0-->not at all   Trouble falling or staying asleep, or sleeping too much?     Feeling tired or having little energy?     Poor appetite or overeating?     Feeling bad about yourself - or that you are a failure or have let yourself or your family down?     Trouble concentrating on things, such as reading the newspaper or watching television?     Moving or speaking so slowly that other people could have noticed? Or the opposite - being so fidgety or restless that you have been moving around a lot more than usual?     Thoughts that you would be better off dead, or of hurting yourself in some way?     PHQ-9 Total Score 0   If you checked off any problems, how difficult have these problems made it for you to do your work, take care of things at home, or get along with other people?        Result Review  Data Reviewed:                   Assessment and Plan      Diagnoses and all orders for this visit:    1. Other headache syndrome (Primary)  Assessment & Plan:  Headaches are worsening.  Further diagnostic evaluation per orders.     In January had episode of severe head pain right fronto-parietal area assoc with a straining cough.   It has persisted   Has peroids of increased pressure pain  Hurts to lay on that side at times  Wasn't associated with nausea, visual changes.   Seems to may be increasing in frequency   Will order mri.        2. Dermoid cyst  Assessment &  Plan:  1 cm dermoid cyst   Need punch biopsy removal  Will schedule appt.            Follow Up   Return in about 2 weeks (around 5/3/2023) for need to sched 30 min for i an d of cyst..  Patient was given instructions and counseling regarding his condition or for health maintenance advice. Please see specific information pulled into the AVS if appropriate.

## 2023-05-04 ENCOUNTER — OFFICE VISIT (OUTPATIENT)
Dept: FAMILY MEDICINE CLINIC | Facility: CLINIC | Age: 28
End: 2023-05-04
Payer: COMMERCIAL

## 2023-05-04 VITALS
BODY MASS INDEX: 28.72 KG/M2 | WEIGHT: 183 LBS | DIASTOLIC BLOOD PRESSURE: 88 MMHG | TEMPERATURE: 99.5 F | SYSTOLIC BLOOD PRESSURE: 136 MMHG | HEIGHT: 67 IN | HEART RATE: 61 BPM | RESPIRATION RATE: 20 BRPM

## 2023-05-04 DIAGNOSIS — L72.3 SEBACEOUS CYST: Primary | ICD-10-CM

## 2023-05-04 PROCEDURE — 99213 OFFICE O/P EST LOW 20 MIN: CPT | Performed by: PEDIATRICS

## 2023-05-04 RX ORDER — SULFAMETHOXAZOLE AND TRIMETHOPRIM 800; 160 MG/1; MG/1
1 TABLET ORAL 2 TIMES DAILY
Qty: 20 TABLET | Refills: 0 | Status: SHIPPED | OUTPATIENT
Start: 2023-05-04

## 2023-05-04 NOTE — PROGRESS NOTES
"Chief Complaint  Skin Problem    Subjective    History of Present Illness      Patient presents to [facility] for   History of Present Illness  Small raised area to his left mid back for about 1 year. It has gotten larger. He states he has skin discoloration to the right groin that sometimes has a raised area that has been there for about 2 month. States when it is flared if is tender to touch.        Review of Systems    I have reviewed and agree with the HPI information as above.  Shaka Day MD     Objective   Vital Signs:   /88   Pulse 61   Temp 99.5 °F (37.5 °C)   Resp 20   Ht 170.2 cm (67\")   Wt 83 kg (183 lb)   BMI 28.66 kg/m²     BMI is >= 25 and <30. (Overweight) The following options were offered after discussion;: nutrition counseling/recommendations      Physical Exam  Constitutional:       Appearance: Normal appearance. He is normal weight.   Cardiovascular:      Rate and Rhythm: Normal rate and regular rhythm.      Heart sounds: Normal heart sounds.   Pulmonary:      Effort: Pulmonary effort is normal.      Breath sounds: Normal breath sounds.   Musculoskeletal:        Legs:    Neurological:      Mental Status: He is alert.   Psychiatric:         Mood and Affect: Mood normal.         Behavior: Behavior normal.          LILA-7: Over the last two weeks, how often have you been bothered by the following problems?  If you checked any problems, how difficult have these problems made it for you to do your work, take care of things at home, or get along with other people: Not difficult at all    PHQ-2 Depression Screening  Little interest or pleasure in doing things? 0-->not at all   Feeling down, depressed, or hopeless? 0-->not at all   PHQ-2 Total Score 0     PHQ-9 Depression Screening  Little interest or pleasure in doing things? 0-->not at all   Feeling down, depressed, or hopeless? 0-->not at all   Trouble falling or staying asleep, or sleeping too much?   "   Feeling tired or having little energy?     Poor appetite or overeating?     Feeling bad about yourself - or that you are a failure or have let yourself or your family down?     Trouble concentrating on things, such as reading the newspaper or watching television?     Moving or speaking so slowly that other people could have noticed? Or the opposite - being so fidgety or restless that you have been moving around a lot more than usual?     Thoughts that you would be better off dead, or of hurting yourself in some way?     PHQ-9 Total Score 0   If you checked off any problems, how difficult have these problems made it for you to do your work, take care of things at home, or get along with other people?        Result Review  Data Reviewed:                   Assessment and Plan      Diagnoses and all orders for this visit:    1. Sebaceous cyst (Primary)  Assessment & Plan:  Very tender area.  Will cover mrsa with bactrim ds   If no better surgical referral Dr Bryant            Follow Up   No follow-ups on file.  Patient was given instructions and counseling regarding his condition or for health maintenance advice. Please see specific information pulled into the AVS if appropriate.

## 2023-05-12 ENCOUNTER — HOSPITAL ENCOUNTER (OUTPATIENT)
Dept: MRI IMAGING | Facility: HOSPITAL | Age: 28
Discharge: HOME | End: 2023-05-12
Payer: COMMERCIAL

## 2023-05-12 DIAGNOSIS — G44.89 OTHER HEADACHE SYNDROME: ICD-10-CM

## 2023-05-12 LAB — CREAT BLDA-MCNC: 1.4 MG/DL (ref 0.6–1.3)

## 2023-05-12 PROCEDURE — 82565 ASSAY OF CREATININE: CPT

## 2023-05-12 PROCEDURE — 70553 MRI BRAIN STEM W/O & W/DYE: CPT

## 2023-05-12 PROCEDURE — 0 GADOBENATE DIMEGLUMINE 529 MG/ML SOLUTION: Performed by: PEDIATRICS

## 2023-05-12 PROCEDURE — A9577 INJ MULTIHANCE: HCPCS | Performed by: PEDIATRICS

## 2023-05-12 RX ADMIN — GADOBENATE DIMEGLUMINE 17 ML: 529 INJECTION, SOLUTION INTRAVENOUS at 10:25

## 2023-05-15 ENCOUNTER — TELEPHONE (OUTPATIENT)
Dept: FAMILY MEDICINE CLINIC | Facility: CLINIC | Age: 28
End: 2023-05-15
Payer: COMMERCIAL

## 2023-05-15 NOTE — TELEPHONE ENCOUNTER
----- Message from Shaka Day MD sent at 5/15/2023  2:22 PM CDT -----  1. Normal brain MRI with contrast

## 2023-08-28 ENCOUNTER — OFFICE VISIT (OUTPATIENT)
Dept: GASTROENTEROLOGY | Facility: CLINIC | Age: 28
End: 2023-08-28
Payer: COMMERCIAL

## 2023-08-28 VITALS
WEIGHT: 187 LBS | HEIGHT: 67 IN | DIASTOLIC BLOOD PRESSURE: 70 MMHG | BODY MASS INDEX: 29.35 KG/M2 | SYSTOLIC BLOOD PRESSURE: 118 MMHG | HEART RATE: 80 BPM | OXYGEN SATURATION: 98 % | TEMPERATURE: 98.2 F

## 2023-08-28 DIAGNOSIS — K76.0 NAFLD (NONALCOHOLIC FATTY LIVER DISEASE): ICD-10-CM

## 2023-08-28 DIAGNOSIS — R10.11 RUQ PAIN: Primary | ICD-10-CM

## 2023-08-28 PROCEDURE — 99204 OFFICE O/P NEW MOD 45 MIN: CPT | Performed by: INTERNAL MEDICINE

## 2023-08-28 NOTE — H&P (VIEW-ONLY)
Chief Complaint   Patient presents with    GI Problem     C/O GERD for 4-5 years, never had endo       PCP: Shaka Day MD  REFER: Mellisa Harrison APRN    Subjective     HPI             RUQ pain on and off for the last year much more freq now  Eating doesn't make it worse  PPI helps his GERD issues which are different than this ruq pain  Denies a change in his BM  Denies gi bleeding  Denies weight loss       Past Medical History:   Diagnosis Date    Renal disorder        Past Surgical History:   Procedure Laterality Date    CYSTOSCOPY, RETROGRADE PYELOGRAM AND STENT INSERTION Right 7/6/2021    Procedure: CYSTOSCOPY RETROGRADE PYELOGRAM AND STENT INSERTION;  Surgeon: Jose Au MD;  Location:  [facility] OR;  Service: Urology;  Laterality: Right;    HERNIA REPAIR      URETEROSCOPY LASER LITHOTRIPSY WITH STENT INSERTION Right 7/6/2021    Procedure: URETEROSCOPY LASER LITHOTRIPSY WITH STENT INSERTION;  Surgeon: Jose Au MD;  Location:  [facility] OR;  Service: Urology;  Laterality: Right;       Outpatient Medications Marked as Taking for the 8/28/23 encounter (Office Visit) with Mo Kennedy, DO   Medication Sig Dispense Refill    omeprazole (priLOSEC) 20 MG capsule Take 1 capsule by mouth Daily.      vitamin D3 125 MCG (5000 UT) capsule capsule Take 1 capsule by mouth Daily.         No Known Allergies    Social History     Socioeconomic History    Marital status: Single   Tobacco Use    Smoking status: Never    Smokeless tobacco: Never   Vaping Use    Vaping Use: Never used   Substance and Sexual Activity    Alcohol use: Not Currently    Drug use: Not Currently    Sexual activity: Defer       Review of Systems   Constitutional:  Negative for fever and unexpected weight change.   HENT:  Negative for trouble swallowing.    Respiratory:  Negative for shortness of breath.    Cardiovascular:  Negative for chest pain.   Gastrointestinal:  Negative for abdominal pain and anal bleeding.     Objective  "    Vitals:    08/28/23 1420   BP: 118/70   Pulse: 80   Temp: 98.2 °F (36.8 °C)   SpO2: 98%   Weight: 84.8 kg (187 lb)   Height: 170.2 cm (67\")     Body mass index is 29.29 kg/m².    Physical Exam  Constitutional:       Appearance: Normal appearance. He is well-developed.   Eyes:      General: No scleral icterus.  Cardiovascular:      Heart sounds: Normal heart sounds. No murmur heard.  Pulmonary:      Effort: Pulmonary effort is normal.   Abdominal:      General: Bowel sounds are normal. There is no distension.      Palpations: Abdomen is soft.      Tenderness: There is no abdominal tenderness. There is no guarding.   Skin:     General: Skin is warm and dry.      Coloration: Skin is not jaundiced.   Neurological:      Mental Status: He is alert.   Psychiatric:         Behavior: Behavior is cooperative.       Imaging Results (Most Recent)       None            Body mass index is 29.29 kg/m².    Assessment & Plan     Diagnoses and all orders for this visit:    1. RUQ pain (Primary)  -     Case Request; Standing  -     Implement Anesthesia Orders Day of Procedure; Standing  -     Obtain Informed Consent; Standing  -     Case Request    2. NAFLD (nonalcoholic fatty liver disease)            ESOPHAGOGASTRODUODENOSCOPY WITH ANESTHESIA (N/A)        Advised pt to stop use of NSAIDs, Fish Oil, and MV 5 days prior to procedure, per Dr Kennedy protocol.  Tylenol based products are ok to take.  Pt verbalized understanding.        Mo Kennedy, DO  08/28/23          There are no Patient Instructions on file for this visit.    "

## 2023-08-28 NOTE — PROGRESS NOTES
Chief Complaint   Patient presents with    GI Problem     C/O GERD for 4-5 years, never had endo       PCP: Shaka Day MD  REFER: Mellisa Harrison APRN    Subjective     HPI             RUQ pain on and off for the last year much more freq now  Eating doesn't make it worse  PPI helps his GERD issues which are different than this ruq pain  Denies a change in his BM  Denies gi bleeding  Denies weight loss       Past Medical History:   Diagnosis Date    Renal disorder        Past Surgical History:   Procedure Laterality Date    CYSTOSCOPY, RETROGRADE PYELOGRAM AND STENT INSERTION Right 7/6/2021    Procedure: CYSTOSCOPY RETROGRADE PYELOGRAM AND STENT INSERTION;  Surgeon: Jose Au MD;  Location:  [facility];  Service: Urology;  Laterality: Right;    HERNIA REPAIR      URETEROSCOPY LASER LITHOTRIPSY WITH STENT INSERTION Right 7/6/2021    Procedure: URETEROSCOPY LASER LITHOTRIPSY WITH STENT INSERTION;  Surgeon: Jose Au MD;  Location:  [facility] OR;  Service: Urology;  Laterality: Right;       Outpatient Medications Marked as Taking for the 8/28/23 encounter (Office Visit) with Mo Kennedy, DO   Medication Sig Dispense Refill    omeprazole (priLOSEC) 20 MG capsule Take 1 capsule by mouth Daily.      vitamin D3 125 MCG (5000 UT) capsule capsule Take 1 capsule by mouth Daily.         No Known Allergies    Social History     Socioeconomic History    Marital status: Single   Tobacco Use    Smoking status: Never    Smokeless tobacco: Never   Vaping Use    Vaping Use: Never used   Substance and Sexual Activity    Alcohol use: Not Currently    Drug use: Not Currently    Sexual activity: Defer       Review of Systems   Constitutional:  Negative for fever and unexpected weight change.   HENT:  Negative for trouble swallowing.    Respiratory:  Negative for shortness of breath.    Cardiovascular:  Negative for chest pain.   Gastrointestinal:  Negative for abdominal pain and anal bleeding.     Objective  "    Vitals:    08/28/23 1420   BP: 118/70   Pulse: 80   Temp: 98.2 øF (36.8 øC)   SpO2: 98%   Weight: 84.8 kg (187 lb)   Height: 170.2 cm (67\")     Body mass index is 29.29 kg/mý.    Physical Exam  Constitutional:       Appearance: Normal appearance. He is well-developed.   Eyes:      General: No scleral icterus.  Cardiovascular:      Heart sounds: Normal heart sounds. No murmur heard.  Pulmonary:      Effort: Pulmonary effort is normal.   Abdominal:      General: Bowel sounds are normal. There is no distension.      Palpations: Abdomen is soft.      Tenderness: There is no abdominal tenderness. There is no guarding.   Skin:     General: Skin is warm and dry.      Coloration: Skin is not jaundiced.   Neurological:      Mental Status: He is alert.   Psychiatric:         Behavior: Behavior is cooperative.       Imaging Results (Most Recent)       None            Body mass index is 29.29 kg/mý.    Assessment & Plan     Diagnoses and all orders for this visit:    1. RUQ pain (Primary)  -     Case Request; Standing  -     Implement Anesthesia Orders Day of Procedure; Standing  -     Obtain Informed Consent; Standing  -     Case Request    2. NAFLD (nonalcoholic fatty liver disease)            ESOPHAGOGASTRODUODENOSCOPY WITH ANESTHESIA (N/A)        Advised pt to stop use of NSAIDs, Fish Oil, and MV 5 days prior to procedure, per Dr Kennedy protocol.  Tylenol based products are ok to take.  Pt verbalized understanding.        Mo Kennedy, DO  08/28/23          There are no Patient Instructions on file for this visit.    "

## 2023-09-18 ENCOUNTER — HOSPITAL ENCOUNTER (OUTPATIENT)
Facility: HOSPITAL | Age: 28
Setting detail: HOSPITAL OUTPATIENT SURGERY
Discharge: HOME | End: 2023-09-18
Attending: INTERNAL MEDICINE | Admitting: INTERNAL MEDICINE
Payer: COMMERCIAL

## 2023-09-18 ENCOUNTER — ANESTHESIA (OUTPATIENT)
Dept: GASTROENTEROLOGY | Facility: HOSPITAL | Age: 28
End: 2023-09-18
Payer: COMMERCIAL

## 2023-09-18 ENCOUNTER — ANESTHESIA EVENT (OUTPATIENT)
Dept: GASTROENTEROLOGY | Facility: HOSPITAL | Age: 28
End: 2023-09-18
Payer: COMMERCIAL

## 2023-09-18 VITALS
RESPIRATION RATE: 20 BRPM | HEART RATE: 74 BPM | WEIGHT: 188 LBS | DIASTOLIC BLOOD PRESSURE: 67 MMHG | SYSTOLIC BLOOD PRESSURE: 117 MMHG | HEIGHT: 67 IN | OXYGEN SATURATION: 97 % | BODY MASS INDEX: 29.51 KG/M2 | TEMPERATURE: 96.8 F

## 2023-09-18 DIAGNOSIS — R10.11 RUQ PAIN: ICD-10-CM

## 2023-09-18 PROCEDURE — 25010000002 PROPOFOL 10 MG/ML EMULSION: Performed by: NURSE ANESTHETIST, CERTIFIED REGISTERED

## 2023-09-18 PROCEDURE — 87081 CULTURE SCREEN ONLY: CPT | Performed by: INTERNAL MEDICINE

## 2023-09-18 RX ORDER — PROPOFOL 10 MG/ML
VIAL (ML) INTRAVENOUS AS NEEDED
Status: DISCONTINUED | OUTPATIENT
Start: 2023-09-18 | End: 2023-09-18 | Stop reason: SURG

## 2023-09-18 RX ORDER — LIDOCAINE HYDROCHLORIDE 20 MG/ML
INJECTION, SOLUTION EPIDURAL; INFILTRATION; INTRACAUDAL; PERINEURAL AS NEEDED
Status: DISCONTINUED | OUTPATIENT
Start: 2023-09-18 | End: 2023-09-18 | Stop reason: SURG

## 2023-09-18 RX ORDER — SODIUM CHLORIDE 9 MG/ML
500 INJECTION, SOLUTION INTRAVENOUS CONTINUOUS PRN
Status: DISCONTINUED | OUTPATIENT
Start: 2023-09-18 | End: 2023-09-18 | Stop reason: HOSPADM

## 2023-09-18 RX ADMIN — SODIUM CHLORIDE 500 ML: 9 INJECTION, SOLUTION INTRAVENOUS at 09:25

## 2023-09-18 RX ADMIN — LIDOCAINE HYDROCHLORIDE 100 MG: 20 INJECTION, SOLUTION EPIDURAL; INFILTRATION; INTRACAUDAL; PERINEURAL at 09:54

## 2023-09-18 RX ADMIN — PROPOFOL INJECTABLE EMULSION 250 MG: 10 INJECTION, EMULSION INTRAVENOUS at 09:54

## 2023-09-18 NOTE — ANESTHESIA POSTPROCEDURE EVALUATION
"Patient: Manuel Cordova    Procedure Summary       Date: 09/18/23 Room / Location: [facility] ENDOSCOPY 4 / [facility]    Anesthesia Start: 0953 Anesthesia Stop: 0959    Procedure: ESOPHAGOGASTRODUODENOSCOPY WITH ANESTHESIA Diagnosis:       RUQ pain      (RUQ pain [R10.11])    Surgeons: Mo Kennedy DO Provider: Una Todd CRNA    Anesthesia Type: MAC ASA Status: 1            Anesthesia Type: MAC    Vitals  Vitals Value Taken Time   /67 09/18/23 1006   Temp     Pulse 74 09/18/23 1007   Resp 22 09/18/23 1000   SpO2 94 % 09/18/23 1007   Vitals shown include unvalidated device data.        Post Anesthesia Care and Evaluation    Patient location during evaluation: PACU  Patient participation: complete - patient participated  Level of consciousness: awake and alert  Pain management: adequate    Airway patency: patent  Anesthetic complications: No anesthetic complications    Cardiovascular status: acceptable  Respiratory status: acceptable  Hydration status: acceptable    Comments: Blood pressure 118/70, pulse 91, temperature 96.8 °F (36 °C), temperature source Temporal, resp. rate 22, height 170.2 cm (67\"), weight 85.3 kg (188 lb), SpO2 93 %.    Pt discharged from PACU based on chriss score >8    "

## 2023-09-18 NOTE — ANESTHESIA PREPROCEDURE EVALUATION
Anesthesia Evaluation     Patient summary reviewed   no history of anesthetic complications:   NPO Solid Status: > 8 hours  NPO Liquid Status: > 8 hours           Airway   Mallampati: I  TM distance: >3 FB  No difficulty expected  Dental - normal exam     Pulmonary - negative pulmonary ROS   Cardiovascular - negative cardio ROS  Exercise tolerance: excellent (>7 METS)        Neuro/Psych- negative ROS  GI/Hepatic/Renal/Endo    (+) GERD, renal disease stones    Musculoskeletal     Abdominal    Substance History      OB/GYN          Other                      Anesthesia Plan    ASA 1     MAC     intravenous induction     Anesthetic plan, risks, benefits, and alternatives have been provided, discussed and informed consent has been obtained with: patient.    CODE STATUS:

## 2023-09-19 LAB — UREASE TISS QL: NEGATIVE

## 2023-11-15 ENCOUNTER — OFFICE VISIT (OUTPATIENT)
Dept: SURGERY | Facility: CLINIC | Age: 28
End: 2023-11-15
Payer: COMMERCIAL

## 2023-11-15 VITALS
DIASTOLIC BLOOD PRESSURE: 76 MMHG | OXYGEN SATURATION: 98 % | WEIGHT: 190 LBS | HEART RATE: 73 BPM | HEIGHT: 67 IN | SYSTOLIC BLOOD PRESSURE: 121 MMHG | BODY MASS INDEX: 29.82 KG/M2

## 2023-11-15 DIAGNOSIS — L72.0 EPIDERMAL INCLUSION CYST: Primary | ICD-10-CM

## 2023-11-15 DIAGNOSIS — L02.212 CUTANEOUS ABSCESS OF BACK EXCLUDING BUTTOCKS: ICD-10-CM

## 2023-11-15 NOTE — PROGRESS NOTES
Office New Patient History and Physical:     Referring Provider: Tesfaye Morgan APRN    Chief Complaint   Patient presents with    Abscess     Mr. Cordova is here for an abscess on back.        Subjective .     History of present illness:  Manuel Cordova is a 28 y.o. male who had a cyst on his back that was approximately the size of a quarter. Over the weekend it increased to the size of a baseball and is red. It is painful. It is draining pus. It has never been infected before; no prior surgical intervention. He has not started the antibiotics yet.     BMI is 29. No blood thinners. Non-smoker.     History  Past Medical History:   Diagnosis Date    Acid reflux     Fatty liver     Renal disorder    ,   Past Surgical History:   Procedure Laterality Date    CYSTOSCOPY, RETROGRADE PYELOGRAM AND STENT INSERTION Right 7/6/2021    Procedure: CYSTOSCOPY RETROGRADE PYELOGRAM AND STENT INSERTION;  Surgeon: Jose Au MD;  Location: [facility] OR;  Service: Urology;  Laterality: Right;    ENDOSCOPY N/A 9/18/2023    Procedure: ESOPHAGOGASTRODUODENOSCOPY WITH ANESTHESIA;  Surgeon: Mo Kennedy DO;  Location: [facility] ENDOSCOPY;  Service: Gastroenterology;  Laterality: N/A;  pre ruq pain  post normal  Dr. Day    HERNIA REPAIR      URETEROSCOPY LASER LITHOTRIPSY WITH STENT INSERTION Right 7/6/2021    Procedure: URETEROSCOPY LASER LITHOTRIPSY WITH STENT INSERTION;  Surgeon: Jose Au MD;  Location: [facility] OR;  Service: Urology;  Laterality: Right;   ,   Family History   Problem Relation Age of Onset    No Known Problems Mother     No Known Problems Father     Colon cancer Neg Hx     Colon polyps Neg Hx     Esophageal cancer Neg Hx    ,   Social History     Tobacco Use    Smoking status: Never    Smokeless tobacco: Never   Vaping Use    Vaping Use: Never used   Substance Use Topics    Alcohol use: Not Currently    Drug use: Not Currently   , (Not in a hospital admission)   and Allergies:  Patient has no known  "allergies.    Current Outpatient Medications:     omeprazole (priLOSEC) 20 MG capsule, Take 1 capsule by mouth Daily., Disp: , Rfl:     vitamin D3 125 MCG (5000 UT) capsule capsule, Take 1 capsule by mouth Daily., Disp: , Rfl:     sulfamethoxazole-trimethoprim (Bactrim DS) 800-160 MG per tablet, Take 1 tablet by mouth 2 (Two) Times a Day. (Patient not taking: Reported on 11/15/2023), Disp: 20 tablet, Rfl: 0    Objective     Vital Signs   /76 (BP Location: Right arm, Patient Position: Sitting, Cuff Size: Adult)   Pulse 73   Ht 170.2 cm (67\")   Wt 86.2 kg (190 lb)   SpO2 98%   BMI 29.76 kg/m²      Physical Exam:  General appearance - alert, well appearing, and in no distress  Mental status - alert, oriented to person, place, and time  Eyes - pupils equal and reactive, extraocular eye movements intact  Neck - supple, no significant adenopathy  Chest - no tachypnea, retractions or cyanosis  Heart - normal rate and regular rhythm  Abdomen - soft, nontender, nondistended, no masses or organomegaly  Neurological - alert, oriented, normal speech, no focal findings or movement disorder noted  Skin -     Results Review:     The following data was reviewed by: Edita Lynn MD on 11/15/2023:  REFERRAL/PRE-AUTH MRN - SCAN - REF FROM CHANDLER SANDOVAL NP 11/15/23 (11/15/2023)     Assessment & Plan       Diagnoses and all orders for this visit:    1. Epidermal inclusion cyst (Primary)    2. Cutaneous abscess of back excluding buttocks       Mr. Cordova is a 28 year old male with an infected epidermal inclusion cyst on his back. I have recommended an incision and drainage in the office today and for him to start his antibiotics. He is in agreement with this plan. We discussed the risks of bleeding and infection. He will follow up with me next week. Wash it daily with soap and water and keep covered.     Incision and drainage procedure report:     Consent was obtained. The area was prepped with betadine and draped in " sterile fashion.  A timeout was performed. Local anesthetic was infiltrated. An incision was made and purulent material was expressed. It was irrigated. A dressing of gauze and tape was placed. The patient tolerated the procedure well.     This is a new acute problem. I have reviewed the note above.     Edita Lynn MD  11/17/23  15:25 CST

## 2023-11-17 NOTE — PATIENT INSTRUCTIONS

## 2023-11-22 ENCOUNTER — OFFICE VISIT (OUTPATIENT)
Dept: SURGERY | Facility: CLINIC | Age: 28
End: 2023-11-22
Payer: COMMERCIAL

## 2023-11-22 VITALS — WEIGHT: 190 LBS | BODY MASS INDEX: 29.82 KG/M2 | HEIGHT: 67 IN

## 2023-11-22 DIAGNOSIS — L72.0 EPIDERMAL INCLUSION CYST: Primary | ICD-10-CM

## 2023-11-22 DIAGNOSIS — E66.3 OVERWEIGHT (BMI 25.0-29.9): ICD-10-CM

## 2023-11-22 PROCEDURE — 99213 OFFICE O/P EST LOW 20 MIN: CPT | Performed by: STUDENT IN AN ORGANIZED HEALTH CARE EDUCATION/TRAINING PROGRAM

## 2023-11-22 NOTE — PROGRESS NOTES
Office Established Patient Note:     Referring Provider: No ref. provider found    Chief Complaint   Patient presents with    Post-op    Abscess     Mr Cordova is here for follow up s/p I&D abscess.       Subjective .     History of present illness:  Manuel Cordova is a 28 y.o. male s/p I&D of an infected cyst on the back. He reports his pain is much improved. No further drainage. No fevers.     History  Past Medical History:   Diagnosis Date    Acid reflux     Fatty liver     Renal disorder    ,   Past Surgical History:   Procedure Laterality Date    CYSTOSCOPY, RETROGRADE PYELOGRAM AND STENT INSERTION Right 7/6/2021    Procedure: CYSTOSCOPY RETROGRADE PYELOGRAM AND STENT INSERTION;  Surgeon: Jose Au MD;  Location: [facility] OR;  Service: Urology;  Laterality: Right;    ENDOSCOPY N/A 9/18/2023    Procedure: ESOPHAGOGASTRODUODENOSCOPY WITH ANESTHESIA;  Surgeon: Mo Kennedy DO;  Location: [facility] ENDOSCOPY;  Service: Gastroenterology;  Laterality: N/A;  pre ruq pain  post normal  Dr. Day    HERNIA REPAIR      URETEROSCOPY LASER LITHOTRIPSY WITH STENT INSERTION Right 7/6/2021    Procedure: URETEROSCOPY LASER LITHOTRIPSY WITH STENT INSERTION;  Surgeon: Jose Au MD;  Location: [facility] OR;  Service: Urology;  Laterality: Right;   ,   Family History   Problem Relation Age of Onset    No Known Problems Mother     No Known Problems Father     Colon cancer Neg Hx     Colon polyps Neg Hx     Esophageal cancer Neg Hx    ,   Social History     Tobacco Use    Smoking status: Never    Smokeless tobacco: Never   Vaping Use    Vaping Use: Never used   Substance Use Topics    Alcohol use: Not Currently    Drug use: Not Currently   , (Not in a hospital admission)   and Allergies:  Patient has no known allergies.    Current Outpatient Medications:     omeprazole (priLOSEC) 20 MG capsule, Take 1 capsule by mouth Daily., Disp: , Rfl:     sulfamethoxazole-trimethoprim (Bactrim DS) 800-160 MG per tablet, Take 1  "tablet by mouth 2 (Two) Times a Day. (Patient not taking: Reported on 11/15/2023), Disp: 20 tablet, Rfl: 0    vitamin D3 125 MCG (5000 UT) capsule capsule, Take 1 capsule by mouth Daily., Disp: , Rfl:     Objective     Vital Signs   Ht 170.2 cm (67\")   Wt 86.2 kg (190 lb)   BMI 29.76 kg/m²      Physical Exam:  General appearance - alert, well appearing, and in no distress  Mental status - alert, oriented to person, place, and time  Eyes - pupils equal and reactive, extraocular eye movements intact  Neck - supple, no significant adenopathy  Chest - no tachypnea, retractions or cyanosis  Heart - normal rate and regular rhythm  Abdomen - soft, nontender, nondistended, no masses or organomegaly  Neurological - alert, oriented, normal speech, no focal findings or movement disorder noted  Skin - back cyst s/p I&D. No further signs of infection.     Results Review:     The following data was reviewed by: Edita Lynn MD on 11/22/2023:  None     Assessment & Plan       Diagnoses and all orders for this visit:    1. Epidermal inclusion cyst (Primary)    2. Overweight (BMI 25.0-29.9)       Mr. Cordova is a 28 year old male s/p I&D of an infected back epidermal inclusion cyst. Infection resolved. I have recommended cyst excision in the office under local. He is in agreement with this plan and is scheduled for this procedure on Wednesday 11/29 at 3:45. We discussed the risks including bleeding, infection and cyst recurrence.     This is a chronic problem. He is at increased risk of periprocedure complications 2/2 his elevated BMI.             Edita Lynn MD  11/24/23  09:07 CST            "

## 2023-11-24 NOTE — PATIENT INSTRUCTIONS

## 2023-11-29 ENCOUNTER — PROCEDURE VISIT (OUTPATIENT)
Dept: SURGERY | Facility: CLINIC | Age: 28
End: 2023-11-29
Payer: COMMERCIAL

## 2023-11-29 VITALS
HEIGHT: 67 IN | WEIGHT: 190 LBS | DIASTOLIC BLOOD PRESSURE: 82 MMHG | OXYGEN SATURATION: 98 % | SYSTOLIC BLOOD PRESSURE: 119 MMHG | BODY MASS INDEX: 29.82 KG/M2 | HEART RATE: 82 BPM

## 2023-11-29 DIAGNOSIS — L72.0 EPIDERMAL INCLUSION CYST: Primary | ICD-10-CM

## 2023-11-29 PROCEDURE — 88304 TISSUE EXAM BY PATHOLOGIST: CPT

## 2023-11-30 NOTE — PROGRESS NOTES
Patient: Manuel Cordova    YOB: 1995    Date: 11/29/2023    Primary Care Provider: Tesfaye Morgan APRN    Procedure:  Manuel Cordova presents for excision of right back cyst.     The risks, benefits, complications, and possible alternatives of the above procedure were discussed with the patient who agreed to proceed. Consent obtained.     The area was prepped with betadine and draped in sterile fashion. A timeout was performed. Local anesthetic was infiltrated. An elliptical incision was made encompassing the I&D incision. The cyst was excised sharply including the capsule. It was sent for permanent pathology. Hemostasis was obtained. The incision was closed with interrupted 3-0 vicryl dermal sutures followed by a 4-0 vicryl running subcuticular. It was dressed with sking lue. The patient tolerated the procedure well.      Findings: 1.5 cm epidermal inclusion cyst      ASSESSMENT/PLAN:    Diagnoses and all orders for this visit:    1. Epidermal inclusion cyst (Primary)  -     Tissue Pathology Exam; Future  -     Tissue Pathology Exam      Follow up as needed. Call with questions or concerns.     Electronically signed by Edita Lynn MD  11/29/23  21:13 CST

## 2023-11-30 NOTE — PATIENT INSTRUCTIONS

## 2023-12-02 LAB
CYTO UR: NORMAL
LAB AP CASE REPORT: NORMAL
LAB AP DIAGNOSIS COMMENT: NORMAL
Lab: NORMAL
PATH REPORT.FINAL DX SPEC: NORMAL
PATH REPORT.GROSS SPEC: NORMAL

## 2023-12-04 NOTE — PROGRESS NOTES
Patient called and made aware of pathology results. He states he feels the incision is healing up well and has no issues at this time. I let him know to call us if he has any issues or concerns with the area and he voiced understanding of this.

## 2024-04-01 ENCOUNTER — TRANSCRIBE ORDERS (OUTPATIENT)
Dept: ADMINISTRATIVE | Facility: HOSPITAL | Age: 29
End: 2024-04-01
Payer: COMMERCIAL

## 2024-04-01 DIAGNOSIS — R94.4 ABNORMAL RESULTS OF KIDNEY FUNCTION STUDIES: Primary | ICD-10-CM

## 2025-03-03 ENCOUNTER — TELEPHONE (OUTPATIENT)
Age: 30
End: 2025-03-03

## 2025-03-03 ENCOUNTER — OFFICE VISIT (OUTPATIENT)
Age: 30
End: 2025-03-03

## 2025-03-03 VITALS — BODY MASS INDEX: 29.4 KG/M2 | WEIGHT: 194 LBS | HEIGHT: 68 IN

## 2025-03-03 DIAGNOSIS — S52.572A OTHER CLOSED INTRA-ARTICULAR FRACTURE OF DISTAL END OF LEFT RADIUS, INITIAL ENCOUNTER: Primary | ICD-10-CM

## 2025-03-03 PROBLEM — S52.502A CLOSED FRACTURE OF LEFT DISTAL RADIUS: Status: ACTIVE | Noted: 2025-03-03

## 2025-03-03 PROCEDURE — 99214 OFFICE O/P EST MOD 30 MIN: CPT | Performed by: PHYSICIAN ASSISTANT

## 2025-03-03 RX ORDER — OMEPRAZOLE 20 MG/1
20 CAPSULE, DELAYED RELEASE ORAL DAILY
COMMUNITY
Start: 2024-09-18

## 2025-03-03 RX ORDER — SERTRALINE HYDROCHLORIDE 100 MG/1
50 TABLET, FILM COATED ORAL
COMMUNITY
Start: 2024-06-20

## 2025-03-03 RX ORDER — PREGABALIN 75 MG/1
75 CAPSULE ORAL
COMMUNITY
Start: 2025-02-07

## 2025-03-03 RX ORDER — KETOROLAC TROMETHAMINE 10 MG/1
TABLET, FILM COATED ORAL
COMMUNITY
Start: 2025-03-03

## 2025-03-03 RX ORDER — TRAMADOL HYDROCHLORIDE 50 MG/1
TABLET ORAL
COMMUNITY
Start: 2025-03-02

## 2025-03-03 RX ORDER — SUMATRIPTAN 50 MG/1
50 TABLET, FILM COATED ORAL
COMMUNITY
Start: 2024-03-19

## 2025-03-03 ASSESSMENT — ENCOUNTER SYMPTOMS
BACK PAIN: 0
COLOR CHANGE: 0

## 2025-03-03 NOTE — ASSESSMENT & PLAN NOTE
Per Dr. Beltran's recommendation, we will obtain a stat CT scan and see him back to further discuss surgical versus conservative treatment options.  It was also recommended to keep him in a removable brace at this time.  He is to frequently ice and elevate the wrist and limit activity and weightbearing of the extremity.

## 2025-03-03 NOTE — TELEPHONE ENCOUNTER
Patient is stating he needs a light duty excuse for 2 weeks sent to his work [employer]    Fax: [phone]

## 2025-03-03 NOTE — PROGRESS NOTES
[facility]  [facility]  [address] [place] [address]  Dept: [phone]  Dept Fax: [phone]  Loc: [phone]    Dennis Holguin is a 29 y.o. male who presents today for his medical conditions/complaints as noted below.  Dennis Holguin is complaining of Wrist Injury (Lt wrist/DOI:3/1/2025)        HPI:   Patient is a pleasant 29-year-old male presenting to the clinic today following up after going to the ER 2 days ago on 3/1/2025 after being thrown off his horse.  X-rays were obtained of the left wrist at that time which I reviewed today in office with Dr. Beltran.  These demonstrate a displaced distal radius fracture with loss of appropriate volar tilt and extension into the intra-articular space.  He was placed in a removable brace and instructed to follow-up with us in clinic.    Wrist Injury   Pertinent negatives include no numbness.       Past Medical History:   Diagnosis Date    History of removal of cyst     Lower Right Side of Back       Past Surgical History:   Procedure Laterality Date    HERNIA REPAIR Left        History reviewed. No pertinent family history.    Social History     Tobacco Use    Smoking status: Never    Smokeless tobacco: Never   Substance Use Topics    Alcohol use: Not on file        Current Outpatient Medications   Medication Sig Dispense Refill    vitamin D (CHOLECALCIFEROL) 125 MCG (5000 UT) CAPS capsule Take 1 capsule by mouth daily      ketorolac (TORADOL) 10 MG tablet       omeprazole (PRILOSEC) 20 MG delayed release capsule Take 1 capsule by mouth daily      pregabalin (LYRICA) 75 MG capsule Take 1 capsule by mouth.      sertraline (ZOLOFT) 100 MG tablet Take 0.5 tablets by mouth      SUMAtriptan (IMITREX) 50 MG tablet Take 1 tablet by mouth      tiZANidine (ZANAFLEX) 4 MG tablet Take by mouth      traMADol (ULTRAM) 50 MG tablet TAKE 1 TABLET BY MOUTH EVERY 6 HOURS AS NEEDED PAIN       No current

## 2025-03-10 NOTE — PROGRESS NOTES
hand.      Imaging  XR WRIST LEFT (MIN 3 VIEWS)  Result Date: 3/11/2025  PA, oblique and lateral views of the left wrist were obtained today per my interpretation reveal intra-articular fracture of left distal radius with significant impaction of the dorsal lip and extension of the lunate facet.  Radiocarpal joint is reduced.  No other acute fracture or dislocation is noted.             Heena Collins is a 29 y.o. male who returns today for follow-up of a traumatic closed displaced intra-articular left distal radius fracture sustained after he was bucked off of a horse on 3/1/2025.  We reviewed clinical and radiographic findings today.  We have been able to obtain approval through insurance for a stat CT scan which we will attempt to get performed here in the next day or 2 and follow-up later this week to discuss results and treatment options.  Due to the level of dorsal angulation and potential joint impaction, he may benefit from operative intervention.  He will continue with bracing and nonweightbearing status to the left wrist in the interim.  All questions were answered today.        This dictation was generated by voice recognition computer software. Although all attempts are made to edit the dictation for accuracy, there may be errors in the transcription that are not intended.    Electronically signed by Khanh Beltran MD on 3/11/2025 at 4:29 PM

## 2025-03-11 ENCOUNTER — TRANSCRIBE ORDERS (OUTPATIENT)
Dept: ADMINISTRATIVE | Facility: HOSPITAL | Age: 30
End: 2025-03-11
Payer: COMMERCIAL

## 2025-03-11 ENCOUNTER — HOSPITAL ENCOUNTER (OUTPATIENT)
Dept: CT IMAGING | Facility: HOSPITAL | Age: 30
Discharge: HOME | End: 2025-03-11
Admitting: PHYSICIAN ASSISTANT
Payer: COMMERCIAL

## 2025-03-11 ENCOUNTER — OFFICE VISIT (OUTPATIENT)
Age: 30
End: 2025-03-11
Payer: COMMERCIAL

## 2025-03-11 VITALS — HEIGHT: 68 IN | BODY MASS INDEX: 29.4 KG/M2 | WEIGHT: 194 LBS

## 2025-03-11 DIAGNOSIS — S52.572A OTH INTARTIC FRACTURE OF LOWER END OF LEFT RADIUS, INIT: ICD-10-CM

## 2025-03-11 DIAGNOSIS — S52.572D OTHER CLOSED INTRA-ARTICULAR FRACTURE OF DISTAL END OF LEFT RADIUS WITH ROUTINE HEALING, SUBSEQUENT ENCOUNTER: Primary | ICD-10-CM

## 2025-03-11 DIAGNOSIS — S52.572A OTH INTARTIC FRACTURE OF LOWER END OF LEFT RADIUS, INIT: Primary | ICD-10-CM

## 2025-03-11 PROCEDURE — 99213 OFFICE O/P EST LOW 20 MIN: CPT | Performed by: ORTHOPAEDIC SURGERY

## 2025-03-11 PROCEDURE — 73200 CT UPPER EXTREMITY W/O DYE: CPT

## 2025-03-13 NOTE — PROGRESS NOTES
[facility]  [facility]  [address]  [facility] [id number] [phone]     Patient: Dennis Holguin   YOB: 1995   Date: 3/14/2025     Chief Complaint   Patient presents with    Follow-up     Left wrist         History of Present Illness  Dennis is a 29 y.o. male who returns today for follow-up of a left intra-articular distal radius fracture sustained on 3/1/2025 after being thrown off of a horse.  He returns today for follow-up after CT scan of the wrist performed at [facility] which was reviewed today.  He reports ongoing, relatively stable symptoms.      Past Medical History:   Diagnosis Date    History of removal of cyst     Lower Right Side of Back      Past Surgical History:   Procedure Laterality Date    HERNIA REPAIR Left       Social History     Socioeconomic History    Marital status: Single   Tobacco Use    Smoking status: Never    Smokeless tobacco: Never   Substance and Sexual Activity    Alcohol use: Not Currently     Alcohol/week: 3.0 standard drinks of alcohol     Types: 3 Cans of beer per week    Drug use: Never    Sexual activity: Not Currently     Partners: Female     Social Drivers of Health      Received from [facility]    Family and Community Support    Received from [facility]    Abuse Screen    Received from [facility]    Housing Stability      Social History     Occupational History    Not on file   Tobacco Use    Smoking status: Never    Smokeless tobacco: Never   Substance and Sexual Activity    Alcohol use: Not Currently     Alcohol/week: 3.0 standard drinks of alcohol     Types: 3 Cans of beer per week    Drug use: Never    Sexual activity: Not Currently     Partners: Female        Tobacco Use      Smoking status: Never      Smokeless tobacco: Never     Family History   Problem Relation Age of Onset    Diabetes Maternal Grandfather

## 2025-03-14 ENCOUNTER — OFFICE VISIT (OUTPATIENT)
Age: 30
End: 2025-03-14
Payer: COMMERCIAL

## 2025-03-14 VITALS — BODY MASS INDEX: 29.4 KG/M2 | HEIGHT: 68 IN | WEIGHT: 194 LBS

## 2025-03-14 DIAGNOSIS — S52.572D OTHER CLOSED INTRA-ARTICULAR FRACTURE OF DISTAL END OF LEFT RADIUS WITH ROUTINE HEALING, SUBSEQUENT ENCOUNTER: Primary | ICD-10-CM

## 2025-03-14 PROCEDURE — 99213 OFFICE O/P EST LOW 20 MIN: CPT | Performed by: ORTHOPAEDIC SURGERY

## 2025-03-24 NOTE — PROGRESS NOTES
[facility]  [facility]  [address]  [facility] [id number] [phone]     Patient: Dennis Holguin   YOB: 1995   Date: 3/25/2025     Chief Complaint   Patient presents with    Follow-up     Left wrist        History of Present Illness  Dennis is a 29 y.o. male who returns today for follow-up of a left intra-articular distal radius fracture sustained on 3/1/2025 after being thrown off of a horse.  He returns today for routine surveillance.  Feels that the wrist is improving, albeit slowly.  Has some persistent stiffness.      Past Medical History:   Diagnosis Date    History of removal of cyst     Lower Right Side of Back      Past Surgical History:   Procedure Laterality Date    HERNIA REPAIR Left       Social History     Socioeconomic History    Marital status: Single     Spouse name: None    Number of children: None    Years of education: None    Highest education level: None   Tobacco Use    Smoking status: Never    Smokeless tobacco: Never   Substance and Sexual Activity    Alcohol use: Not Currently     Alcohol/week: 3.0 standard drinks of alcohol     Types: 3 Cans of beer per week    Drug use: Never    Sexual activity: Not Currently     Partners: Female     Social Drivers of Health      Received from [facility]    Family and Community Support    Received from [facility]    Abuse Screen    Received from [facility]    Housing Stability      Social History     Occupational History    Not on file   Tobacco Use    Smoking status: Never    Smokeless tobacco: Never   Substance and Sexual Activity    Alcohol use: Not Currently     Alcohol/week: 3.0 standard drinks of alcohol     Types: 3 Cans of beer per week    Drug use: Never    Sexual activity: Not Currently     Partners: Female        Tobacco Use      Smoking status: Never      Smokeless tobacco: Never     Family History   Problem

## 2025-03-25 ENCOUNTER — OFFICE VISIT (OUTPATIENT)
Age: 30
End: 2025-03-25
Payer: COMMERCIAL

## 2025-03-25 VITALS — HEIGHT: 68 IN | BODY MASS INDEX: 29.4 KG/M2 | WEIGHT: 194 LBS

## 2025-03-25 DIAGNOSIS — S52.572D OTHER CLOSED INTRA-ARTICULAR FRACTURE OF DISTAL END OF LEFT RADIUS WITH ROUTINE HEALING, SUBSEQUENT ENCOUNTER: Primary | ICD-10-CM

## 2025-03-25 PROCEDURE — 99213 OFFICE O/P EST LOW 20 MIN: CPT | Performed by: ORTHOPAEDIC SURGERY

## 2025-04-14 NOTE — PROGRESS NOTES
[facility]  [facility]  [address]  [facility] [id number] [phone]     Patient: Dennis Holguin   YOB: 1995   Date: 4/15/2025     Chief Complaint   Patient presents with    Follow-up     Left wrist        History of Present Illness  Dennis is a 29 y.o. male who returns today for follow-up of a left intra-articular distal radius fracture sustained on 3/1/2025 after being thrown off of a horse.  He returns today for routine surveillance.  Feels that the wrist is improved from his last visit.  Has residual stiffness and mild to moderate discomfort.      Past Medical History:   Diagnosis Date    History of removal of cyst     Lower Right Side of Back      Past Surgical History:   Procedure Laterality Date    HERNIA REPAIR Left       Social History     Socioeconomic History    Marital status: Single     Spouse name: None    Number of children: None    Years of education: None    Highest education level: None   Tobacco Use    Smoking status: Never    Smokeless tobacco: Never   Substance and Sexual Activity    Alcohol use: Not Currently     Alcohol/week: 3.0 standard drinks of alcohol     Types: 3 Cans of beer per week    Drug use: Never    Sexual activity: Not Currently     Partners: Female     Social Drivers of Health      Received from [facility]    Family and Community Support    Received from [facility]    Abuse Screen    Received from [facility]    Housing Stability      Social History     Occupational History    Not on file   Tobacco Use    Smoking status: Never    Smokeless tobacco: Never   Substance and Sexual Activity    Alcohol use: Not Currently     Alcohol/week: 3.0 standard drinks of alcohol     Types: 3 Cans of beer per week    Drug use: Never    Sexual activity: Not Currently     Partners: Female        Tobacco Use      Smoking status: Never      Smokeless tobacco:

## 2025-04-14 NOTE — PROGRESS NOTES
[facility]  [facility]  [address]  [facility] [id number] [phone]     Patient: Dennis Holguin   YOB: 1995   Date: 4/15/2025     No chief complaint on file.       History of Present Illness  Dennis is a {right,left} hand dominant 29 y.o. male who presents today with reports of ***       Past Medical History:   Diagnosis Date    History of removal of cyst     Lower Right Side of Back      Past Surgical History:   Procedure Laterality Date    HERNIA REPAIR Left       Social History     Socioeconomic History    Marital status: Single   Tobacco Use    Smoking status: Never    Smokeless tobacco: Never   Substance and Sexual Activity    Alcohol use: Not Currently     Alcohol/week: 3.0 standard drinks of alcohol     Types: 3 Cans of beer per week    Drug use: Never    Sexual activity: Not Currently     Partners: Female     Social Drivers of Health      Received from [facility]    Family and Community Support    Received from [facility]    Abuse Screen    Received from [facility]    Housing Stability      Social History     Occupational History    Not on file   Tobacco Use    Smoking status: Never    Smokeless tobacco: Never   Substance and Sexual Activity    Alcohol use: Not Currently     Alcohol/week: 3.0 standard drinks of alcohol     Types: 3 Cans of beer per week    Drug use: Never    Sexual activity: Not Currently     Partners: Female        Tobacco Use      Smoking status: Never      Smokeless tobacco: Never     Family History   Problem Relation Age of Onset    Diabetes Maternal Grandfather         Medications  Current Outpatient Medications   Medication Sig Dispense Refill    vitamin D (CHOLECALCIFEROL) 125 MCG (5000 UT) CAPS capsule Take 1 capsule by mouth daily      ketorolac (TORADOL) 10 MG tablet       omeprazole (PRILOSEC) 20 MG delayed release capsule Take 1 capsule by

## 2025-04-15 ENCOUNTER — OFFICE VISIT (OUTPATIENT)
Age: 30
End: 2025-04-15
Payer: COMMERCIAL

## 2025-04-15 VITALS — WEIGHT: 194 LBS | HEIGHT: 67 IN | BODY MASS INDEX: 30.45 KG/M2

## 2025-04-15 DIAGNOSIS — S52.572D OTHER CLOSED INTRA-ARTICULAR FRACTURE OF DISTAL END OF LEFT RADIUS WITH ROUTINE HEALING, SUBSEQUENT ENCOUNTER: Primary | ICD-10-CM

## 2025-04-15 PROCEDURE — 99213 OFFICE O/P EST LOW 20 MIN: CPT | Performed by: ORTHOPAEDIC SURGERY

## 2025-05-21 NOTE — PROGRESS NOTES
[facility]  [facility]  [address]  [facility] [id number] [phone]     Patient: Dennis Holguin   YOB: 1995   Date: 5/27/2025     Chief Complaint   Patient presents with    Left wrist        History of Present Illness  Dennis is a 29 y.o. male who returns today for follow-up of a left intra-articular distal radius fracture sustained on 3/1/2025 after being thrown off of a horse.  He returns today for routine surveillance.  Feels that the wrist is improved from his last visit.  Has residual stiffness and mild discomfort with use.      Past Medical History:   Diagnosis Date    History of removal of cyst     Lower Right Side of Back      Past Surgical History:   Procedure Laterality Date    HERNIA REPAIR Left       Social History     Socioeconomic History    Marital status: Single     Spouse name: None    Number of children: None    Years of education: None    Highest education level: None   Tobacco Use    Smoking status: Never    Smokeless tobacco: Never   Substance and Sexual Activity    Alcohol use: Not Currently     Alcohol/week: 3.0 standard drinks of alcohol     Types: 3 Cans of beer per week    Drug use: Never    Sexual activity: Not Currently     Partners: Female     Social Drivers of Health      Received from [facility]    Family and Community Support    Received from [facility]    Abuse Screen    Received from [facility]    Housing Stability      Social History     Occupational History    Not on file   Tobacco Use    Smoking status: Never    Smokeless tobacco: Never   Substance and Sexual Activity    Alcohol use: Not Currently     Alcohol/week: 3.0 standard drinks of alcohol     Types: 3 Cans of beer per week    Drug use: Never    Sexual activity: Not Currently     Partners: Female        Tobacco Use      Smoking status: Never      Smokeless tobacco: Never     Family

## 2025-05-27 ENCOUNTER — OFFICE VISIT (OUTPATIENT)
Age: 30
End: 2025-05-27
Payer: COMMERCIAL

## 2025-05-27 VITALS — WEIGHT: 194 LBS | HEIGHT: 67 IN | BODY MASS INDEX: 30.45 KG/M2

## 2025-05-27 DIAGNOSIS — S52.572D OTHER CLOSED INTRA-ARTICULAR FRACTURE OF DISTAL END OF LEFT RADIUS WITH ROUTINE HEALING, SUBSEQUENT ENCOUNTER: Primary | ICD-10-CM

## 2025-05-27 PROCEDURE — 99213 OFFICE O/P EST LOW 20 MIN: CPT | Performed by: ORTHOPAEDIC SURGERY

## 2025-09-02 ENCOUNTER — OFFICE VISIT (OUTPATIENT)
Age: 30
End: 2025-09-02
Payer: COMMERCIAL

## 2025-09-02 VITALS — BODY MASS INDEX: 29.29 KG/M2 | HEIGHT: 67 IN | WEIGHT: 186.6 LBS

## 2025-09-02 DIAGNOSIS — S52.572D OTHER CLOSED INTRA-ARTICULAR FRACTURE OF DISTAL END OF LEFT RADIUS WITH ROUTINE HEALING, SUBSEQUENT ENCOUNTER: Primary | ICD-10-CM

## 2025-09-02 PROCEDURE — 99213 OFFICE O/P EST LOW 20 MIN: CPT | Performed by: ORTHOPAEDIC SURGERY

## (undated) DEVICE — CONMED SCOPE SAVER BITE BLOCK, 20X27 MM: Brand: SCOPE SAVER

## (undated) DEVICE — CUFF,BP,DISP,1 TUBE,ADULT,HP: Brand: MEDLINE

## (undated) DEVICE — SHEATH URETRL ACC NAVIGATOR 11/13F 46CM 5PK

## (undated) DEVICE — DUAL LUMEN URETERAL CATHETER

## (undated) DEVICE — CATH URETRL OPN/END 5F70CM

## (undated) DEVICE — FRCP BX RADJAW4 NDL 2.8 240 STD OG

## (undated) DEVICE — SYS IRR PUMP SGL ACTN VAC SYR 10CC

## (undated) DEVICE — GLW STD STR 3CM .035X150CM

## (undated) DEVICE — ENDOSCOPIC SEAL URO 1 SIZE FITS ALL: Brand: ENDOSCOPIC SEAL

## (undated) DEVICE — PK CYSTO 30

## (undated) DEVICE — THE CHANNEL CLEANING BRUSH IS A NYLON FLEXI BRUSH ATTACHED TO A FLEXIBLE PLASTIC SHEATH DESIGNED TO SAFELY REMOVE DEBRIS FROM FLEXIBLE ENDOSCOPES.

## (undated) DEVICE — GLV SURG BIOGEL M LTX PF 7 1/2

## (undated) DEVICE — YANKAUER,BULB TIP WITH VENT: Brand: ARGYLE

## (undated) DEVICE — Device: Brand: DEFENDO AIR/WATER/SUCTION AND BIOPSY VALVE

## (undated) DEVICE — PK TURNOVER CYSTO RM

## (undated) DEVICE — SENSR O2 OXIMAX FNGR A/ 18IN NONSTR

## (undated) DEVICE — FIBR LASR MOSES 200 DFL 2J 80HZ